# Patient Record
Sex: FEMALE | Race: WHITE
[De-identification: names, ages, dates, MRNs, and addresses within clinical notes are randomized per-mention and may not be internally consistent; named-entity substitution may affect disease eponyms.]

---

## 2023-05-25 ENCOUNTER — HOSPITAL ENCOUNTER (INPATIENT)
Dept: HOSPITAL 47 - 4FBP | Age: 38
LOS: 4 days | Discharge: HOME | End: 2023-05-29
Attending: OBSTETRICS & GYNECOLOGY | Admitting: OBSTETRICS & GYNECOLOGY
Payer: COMMERCIAL

## 2023-05-25 DIAGNOSIS — Z88.6: ICD-10-CM

## 2023-05-25 DIAGNOSIS — Z88.5: ICD-10-CM

## 2023-05-25 DIAGNOSIS — T36.95XA: ICD-10-CM
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
## 2023-05-25 DIAGNOSIS — O36.63X0: ICD-10-CM

## 2023-05-25 DIAGNOSIS — Z3A.39: ICD-10-CM

## 2023-05-25 DIAGNOSIS — L29.9: ICD-10-CM

## 2023-05-25 DIAGNOSIS — A08.4: ICD-10-CM

## 2023-05-25 LAB
BASOPHILS # BLD AUTO: 0 K/UL (ref 0–0.2)
BASOPHILS NFR BLD AUTO: 0 %
EOSINOPHIL # BLD AUTO: 0.1 K/UL (ref 0–0.7)
EOSINOPHIL NFR BLD AUTO: 2 %
ERYTHROCYTE [DISTWIDTH] IN BLOOD BY AUTOMATED COUNT: 3.82 M/UL (ref 3.8–5.4)
ERYTHROCYTE [DISTWIDTH] IN BLOOD: 13.1 % (ref 11.5–15.5)
HCT VFR BLD AUTO: 34.8 % (ref 34–46)
HGB BLD-MCNC: 11.9 GM/DL (ref 11.4–16)
LYMPHOCYTES # SPEC AUTO: 2 K/UL (ref 1–4.8)
LYMPHOCYTES NFR SPEC AUTO: 30 %
MCH RBC QN AUTO: 31.2 PG (ref 25–35)
MCHC RBC AUTO-ENTMCNC: 34.2 G/DL (ref 31–37)
MCV RBC AUTO: 91.1 FL (ref 80–100)
MONOCYTES # BLD AUTO: 0.4 K/UL (ref 0–1)
MONOCYTES NFR BLD AUTO: 7 %
NEUTROPHILS # BLD AUTO: 3.9 K/UL (ref 1.3–7.7)
NEUTROPHILS NFR BLD AUTO: 59 %
PLATELET # BLD AUTO: 169 K/UL (ref 150–450)
WBC # BLD AUTO: 6.6 K/UL (ref 3.8–10.6)

## 2023-05-25 PROCEDURE — 86901 BLOOD TYPING SEROLOGIC RH(D): CPT

## 2023-05-25 PROCEDURE — 86850 RBC ANTIBODY SCREEN: CPT

## 2023-05-25 PROCEDURE — 10907ZC DRAINAGE OF AMNIOTIC FLUID, THERAPEUTIC FROM PRODUCTS OF CONCEPTION, VIA NATURAL OR ARTIFICIAL OPENING: ICD-10-PCS

## 2023-05-25 PROCEDURE — 85027 COMPLETE CBC AUTOMATED: CPT

## 2023-05-25 PROCEDURE — 86900 BLOOD TYPING SEROLOGIC ABO: CPT

## 2023-05-25 PROCEDURE — 85025 COMPLETE CBC W/AUTO DIFF WBC: CPT

## 2023-05-25 PROCEDURE — 3E033VJ INTRODUCTION OF OTHER HORMONE INTO PERIPHERAL VEIN, PERCUTANEOUS APPROACH: ICD-10-PCS

## 2023-05-25 RX ADMIN — POTASSIUM CHLORIDE SCH MLS/HR: 14.9 INJECTION, SOLUTION INTRAVENOUS at 14:10

## 2023-05-25 RX ADMIN — POTASSIUM CHLORIDE SCH MLS/HR: 14.9 INJECTION, SOLUTION INTRAVENOUS at 06:47

## 2023-05-25 RX ADMIN — IBUPROFEN SCH MLS/HR: 800 INJECTION INTRAVENOUS at 22:40

## 2023-05-25 RX ADMIN — POTASSIUM CHLORIDE SCH MLS/HR: 14.9 INJECTION, SOLUTION INTRAVENOUS at 22:50

## 2023-05-25 RX ADMIN — ACETAMINOPHEN SCH MLS/HR: 10 INJECTION, SOLUTION INTRAVENOUS at 21:45

## 2023-05-25 NOTE — P.OP
Date of Procedure: 23


Preoperative Diagnosis: 


IUP at 39 weeks, history of LGA, arrest of first stage of labor


Postoperative Diagnosis: 


same, occiput posterior presentation


Procedure(s) Performed: 


Primary low transverse  section


Anesthesia: epidural


Surgeon: Saundra Joy


Assistant #1: Atnonia Machado


Estimated Blood Loss (ml): 230


IV fluids (ml): 800


Urine output (ml): 50


Pathology: other (Placenta)


Condition: stable


Disposition: observation


Indications for Procedure: 


38-year-old  at 39 weeks of pregnancy that presented earlier today for 

induction of labor.  Patient was admitted and Pitocin induction of labor was 

begun.  Patient made minimal progress throughout the day with no fetal descent 

noted.  Patient was counseled on fetal station and lack of dilation, patient 

states "something is wrong" she requested primary .   was 

discussed and anesthesia was called, patient was taken back to the operating 

suite.


Operative Findings: 


Viable male infant delivered at 20:08,  occiput posterior presentation


Weight of 8 lbs. 10 oz., Apgars of


Normal uterus tubes and ovaries were appreciated.


Description of Procedure: 


The patient was prepped and draped in the usual fashion after epidural 

anesthesia was found be adequate..  A Pfannenstiel incision was made and 

extended of the abdominal cavity without difficulty.  The bladder peritoneum was

elevated and incised and reflected distally.  A 2 cm incision was made in the 

transverse plane of the lower uterine segment to enter the uterus at which time 

clear fluid was noted.  The incision was extended in both directions using the 

bandage scissors.  The fetal head was encountered within the field and delivered

up and through the incision where the nose and mouth were thoroughly suctioned. 

Remainder of the infant was delivered onto the surgical field where the cord was

doubly clamped, cut, and the infant was passed for resuscitative measures with 

weight and Apgars as noted above.  The placenta was delivered manually, intact, 

and was grossly normal with a grossly normal three-vessel cord.  The uterus was 

exteriorized and the interior cavity of the uterus swept of any remaining 

placental and membranous fragments with a laparotomy sponge.  The margins of the

incision were grasped with López clamps and the incision closed in 2 

layers.  First layer was a running locking layer of 0 Vicryl from margin to 

margin followed by a second layer of imbricating 0 Vicryl from margin to margin.

 Bleeding was noted on the left-hand side of the hysterotomy incision therefore 

a figure-of-eight suture was used to obtain hemostasis Any small points of 

bleeding were then made hemostatic with the Bovie.  Once hemostasis was 

achieved, the posterior cul-de-sac was suctioned with a guard and the uterine 

and ovarian findings are as noted above.  The uterus was replaced within the 

abdominal cavity and the gutters swept of any remaining blood fluid or clot.  

The incision was again reexamined and hemostasis was noted to be excellent.  Any

small point of bleeding were made hemostatic with the Bovie.  Once hemostasis 

was achieved the parietal peritoneum was loosely reapproximated.  The layer of 

muscles were examined and made hemostatic with the Bovie.  Attention was then 

turned to the fascia which was closed with 2 running stitches of 0 Vicryl 

proceeding from the lateral margins to the midpoint.  The subcutaneous tissues 

were irrigated, made hemostatic with the Bovie, and reapproximated with a 

running stitch of 30 Vicryl.  The skin was reapproximated with  for Vicryl.  

Estimated blood loss for the case was approximately 230 mL.  All sponge 

instrument and needle counts are correct.  There were no complications.  The 

patient tolerated the procedure well and proceeded to the recovery room in 

stable condition.  Both mother and infant are resting comfortably in recovery.

## 2023-05-25 NOTE — P.HPOB
History of Present Illness


H&P Date: 23


Chief Complaint: IUP at 39 weeks, history of LGA





This is a 38-year-old  2 para 1 at 39 1/7 weeks of gestation that 

presents for induction of labor secondary to history of LGA.  Patient has been 

receiving routine prenatal care with myself which has been essentially unc

omplicated.  Patient has been quite uncomfortable and requested induction of 

labor at 39 weeks.  


Patient does note good fetal movement denies vaginal bleeding contractions or 

loss of fluid.


On prenatal blood work this patient a positive, rubella status immune, hepatitis

B surface engine negative, HIV negative, RPR is nonreactive, group strep 

cultures negative.





Review of Systems


Constitutional: Denies chills, Denies fatigue, Denies fever


Ears, nose, mouth and throat: Denies headache


Cardiovascular: Reports leg edema


Respiratory: Denies dyspnea


Gastrointestinal: Denies constipation, Denies diarrhea, Denies nausea, Denies 

vomiting


Genitourinary: Reports pregnant





Past Medical History


Past Medical History: No Reported History


History of Any Multi-Drug Resistant Organisms: None Reported


Past Surgical History: No Surgical Hx Reported


Additional Past Surgical History / Comment(s): knee surgery+ ,


Past Anesthesia/Blood Transfusion Reactions: No Reported Reaction


Past Psychological History: No Psychological Hx Reported


Smoking Status: Never smoker


Past Drug Use History: None Reported





- Past Family History


  ** Mother


Family Medical History: No Reported History


Additional Family Medical History / Comment(s): breast cancer serviver





Medications and Allergies


                                Home Medications











 Medication  Instructions  Recorded  Confirmed  Type


 


Prenatal Vit No.179/Iron/Folic 1 tab PO DAILY 23 History





[Prenatal Tablet]    








                                    Allergies











Allergy/AdvReac Type Severity Reaction Status Date / Time


 


ibuprofen [From Motrin] AdvReac  Diarrhea Verified 23 08:53


 


morphine AdvReac  Vomiting Verified 23 08:53














Exam


Osteopathic Statement: *.  No significant issues noted on an osteopathic 

structural exam other than those noted in the History and Physical/Consult.


                                   Vital Signs











  Temp Pulse Resp BP


 


 23 08:00  97.0 F L  84  16  126/71








                                Intake and Output











 23





 06:59 14:59 22:59


 


Other:   


 


  # Voids  1 


 


  Weight 99.337 kg 99.337 kg 














Targeted physical exam is performed in this date and generalist well-nourished 

well-developed pregnant female in no acute distress, breathing is nonlabored, 

heart has a regular rate and rhythm, abdomen is gravid, on cervical exam she is 

1 thick high amniotomy is performed and clear fluid was obtained, fetal heart 

tones are noted to be category 1 and contractions are noted to be every 4 

minutes





Results


Result Diagrams: 


                                 23 06:40








Assessment and Plan


(1) Term pregnancy


Current Visit: Yes   Status: Acute   Code(s): Z34.90 - ENCNTR FOR SUPRVSN OF 

NORMAL PREGNANCY, UNSP, UNSP TRIMESTER   SNOMED Code(s): 49439741


   


Plan: 





37 yo  at 39 weeks that presents for induction of labor secondary to history

of LGA.  Patient has been quite uncomfortable and did request elective 

induction.  Patient is admitted and Pitocin induction of labor is begun per 

hospital protocol.  Amniotomy is performed and clear fluid was obtained.  

Options for analgesia are discussed including Stadol and epidural.  Patient does

desire epidural when appropriate.

## 2023-05-26 LAB
BASOPHILS # BLD AUTO: 0 K/UL (ref 0–0.2)
BASOPHILS NFR BLD AUTO: 0 %
EOSINOPHIL # BLD AUTO: 0 K/UL (ref 0–0.7)
EOSINOPHIL NFR BLD AUTO: 0 %
ERYTHROCYTE [DISTWIDTH] IN BLOOD BY AUTOMATED COUNT: 3.31 M/UL (ref 3.8–5.4)
ERYTHROCYTE [DISTWIDTH] IN BLOOD: 13.6 % (ref 11.5–15.5)
HCT VFR BLD AUTO: 29.8 % (ref 34–46)
HGB BLD-MCNC: 10.1 GM/DL (ref 11.4–16)
LYMPHOCYTES # SPEC AUTO: 1.1 K/UL (ref 1–4.8)
LYMPHOCYTES NFR SPEC AUTO: 7 %
MCH RBC QN AUTO: 30.5 PG (ref 25–35)
MCHC RBC AUTO-ENTMCNC: 33.9 G/DL (ref 31–37)
MCV RBC AUTO: 90 FL (ref 80–100)
MONOCYTES # BLD AUTO: 0.6 K/UL (ref 0–1)
MONOCYTES NFR BLD AUTO: 4 %
NEUTROPHILS # BLD AUTO: 14.2 K/UL (ref 1.3–7.7)
NEUTROPHILS NFR BLD AUTO: 88 %
PLATELET # BLD AUTO: 156 K/UL (ref 150–450)
WBC # BLD AUTO: 16 K/UL (ref 3.8–10.6)

## 2023-05-26 RX ADMIN — ACETAMINOPHEN SCH MG: 500 TABLET ORAL at 17:07

## 2023-05-26 RX ADMIN — IBUPROFEN SCH MLS/HR: 800 INJECTION INTRAVENOUS at 14:11

## 2023-05-26 RX ADMIN — Medication SCH: at 09:25

## 2023-05-26 RX ADMIN — IBUPROFEN SCH: 600 TABLET ORAL at 09:42

## 2023-05-26 RX ADMIN — IBUPROFEN SCH: 600 TABLET ORAL at 21:23

## 2023-05-26 RX ADMIN — POTASSIUM CHLORIDE SCH: 14.9 INJECTION, SOLUTION INTRAVENOUS at 16:39

## 2023-05-26 RX ADMIN — DOCUSATE SODIUM AND SENNOSIDES SCH: 50; 8.6 TABLET ORAL at 14:13

## 2023-05-26 RX ADMIN — IBUPROFEN SCH MLS/HR: 800 INJECTION INTRAVENOUS at 07:23

## 2023-05-26 RX ADMIN — IBUPROFEN SCH: 600 TABLET ORAL at 13:20

## 2023-05-26 RX ADMIN — IBUPROFEN SCH: 600 TABLET ORAL at 00:46

## 2023-05-26 RX ADMIN — ACETAMINOPHEN SCH MG: 500 TABLET ORAL at 23:20

## 2023-05-26 RX ADMIN — POTASSIUM CHLORIDE SCH: 14.9 INJECTION, SOLUTION INTRAVENOUS at 16:40

## 2023-05-26 RX ADMIN — DOCUSATE SODIUM AND SENNOSIDES SCH EACH: 50; 8.6 TABLET ORAL at 19:52

## 2023-05-26 RX ADMIN — ACETAMINOPHEN SCH: 500 TABLET ORAL at 06:13

## 2023-05-26 RX ADMIN — ACETAMINOPHEN SCH MG: 500 TABLET ORAL at 10:55

## 2023-05-26 RX ADMIN — DOCUSATE SODIUM AND SENNOSIDES SCH: 50; 8.6 TABLET ORAL at 01:58

## 2023-05-26 RX ADMIN — ACETAMINOPHEN SCH MLS/HR: 10 INJECTION, SOLUTION INTRAVENOUS at 05:33

## 2023-05-26 RX ADMIN — IBUPROFEN SCH MLS/HR: 800 INJECTION INTRAVENOUS at 19:51

## 2023-05-26 RX ADMIN — POTASSIUM CHLORIDE SCH MLS/HR: 14.9 INJECTION, SOLUTION INTRAVENOUS at 05:37

## 2023-05-26 RX ADMIN — ACETAMINOPHEN SCH: 500 TABLET ORAL at 00:45

## 2023-05-26 RX ADMIN — POTASSIUM CHLORIDE SCH: 14.9 INJECTION, SOLUTION INTRAVENOUS at 21:23

## 2023-05-26 NOTE — P.PNOBGPC
Subjective





- Subjective


Principal diagnosis: POD 1 LTCS


Interval history: 





patient is doing well postoperatively.  She is a bleeding without difficulty.  

Powell was discontinued and we are awaiting spontaneous void.  Her pain is well-

controlled.


Patient reports: Reports appetite normal, Reports pain well controlled, Reports 

ambulating normally


: doing well





Objective





- Vital Signs


Latest vital signs: 


                                   Vital Signs











  Temp Pulse Resp BP Pulse Ox


 


 23 08:00  98.0 F  96  16  119/77  97


 


 23 04:00  98.3 F  85  19  119/74  97


 


 23 00:00  98.9 F  99  15  125/71  96


 


 23 22:35  98.8 F  75  16  131/67  98


 


 23 22:05  99.1 F  91  16  128/65  99


 


 23 21:35   76  16  120/60  98


 


 23 21:20   71  16  121/59  98


 


 23 21:05  97.9 F  80  16  108/55  97


 


 23 20:50     102/56 


 


 23 20:35  98.2 F  65  16  104/53  94 L








                                Intake and Output











 23





 22:59 06:59 14:59


 


Intake Total 240 480 


 


Output Total 300 1000 


 


Balance -60 -520 


 


Intake:   


 


  Oral 240 480 


 


Output:   


 


  Urine 300 1000 


 


    Uretheral (Powell) 100 400 


 


Other:   


 


  Voiding Method Indwelling Catheter Indwelling Catheter 














- Exam


Extremities: Present: normal, edema


Abdomen: Present: normal appearance, soft


Incision: Present: normal, dry


Uterus: Present: normal, firm





- Labs


Labs: 


                  Abnormal Lab Results - Last 24 Hours (Table)











  23 Range/Units





  06:01 


 


WBC  16.0 H  (3.8-10.6)  k/uL


 


RBC  3.31 L  (3.80-5.40)  m/uL


 


Hgb  10.1 L  (11.4-16.0)  gm/dL


 


Hct  29.8 L  (34.0-46.0)  %


 


Neutrophils #  14.2 H  (1.3-7.7)  k/uL














Assessment and Plan


(1) Term pregnancy


Current Visit: Yes   Status: Acute   Code(s): Z34.90 - ENCNTR FOR SUPRVSN OF 

NORMAL PREGNANCY, UNSP, UNSP TRIMESTER   SNOMED Code(s): 06040173


   





(2) S/P  section


Current Visit: Yes   Status: Acute   Code(s): Z98.891 - HISTORY OF UTERINE SCAR 

FROM PREVIOUS SURGERY   SNOMED Code(s): 510514235


   





(3) Occiput posterior presentation of fetus


Current Visit: Yes   Status: Acute   Code(s): O64.0XX0 - OBSTRUCTED LABOR DUE TO

INCMPL ROTATION OF FETAL HEAD, UNSP   SNOMED Code(s): 87956056


   


Plan: 





38-year-old G2 now P2 status post primary  for arrest of first stage of

labor, upon delivery fetus was noted to be in occiput posterior presentation.  

Patient is doing well postoperatively.  We'll plan to continue routine 

postoperative care and anticipate discharge home tomorrow.

## 2023-05-26 NOTE — P.PN
Progress Note - Text





23  610am


38-year-old female status post .  She was bolused Duramorph Y the 

epidural.  Patient has a VAS of 0 with no complains of nausea vomiting she does 

have pruritus should resolve by the end of the day

## 2023-05-27 LAB
BASOPHILS # BLD AUTO: 0 K/UL (ref 0–0.2)
BASOPHILS NFR BLD AUTO: 0 %
EOSINOPHIL # BLD AUTO: 0.1 K/UL (ref 0–0.7)
EOSINOPHIL NFR BLD AUTO: 1 %
ERYTHROCYTE [DISTWIDTH] IN BLOOD BY AUTOMATED COUNT: 3.09 M/UL (ref 3.8–5.4)
ERYTHROCYTE [DISTWIDTH] IN BLOOD BY AUTOMATED COUNT: 3.11 M/UL (ref 3.8–5.4)
ERYTHROCYTE [DISTWIDTH] IN BLOOD: 13.3 % (ref 11.5–15.5)
ERYTHROCYTE [DISTWIDTH] IN BLOOD: 13.3 % (ref 11.5–15.5)
HCT VFR BLD AUTO: 28.8 % (ref 34–46)
HCT VFR BLD AUTO: 28.9 % (ref 34–46)
HGB BLD-MCNC: 9.5 GM/DL (ref 11.4–16)
HGB BLD-MCNC: 9.5 GM/DL (ref 11.4–16)
LYMPHOCYTES # SPEC AUTO: 0.7 K/UL (ref 1–4.8)
LYMPHOCYTES NFR SPEC AUTO: 8 %
MCH RBC QN AUTO: 30.7 PG (ref 25–35)
MCH RBC QN AUTO: 30.9 PG (ref 25–35)
MCHC RBC AUTO-ENTMCNC: 33 G/DL (ref 31–37)
MCHC RBC AUTO-ENTMCNC: 33 G/DL (ref 31–37)
MCV RBC AUTO: 93.1 FL (ref 80–100)
MCV RBC AUTO: 93.4 FL (ref 80–100)
MONOCYTES # BLD AUTO: 0.4 K/UL (ref 0–1)
MONOCYTES NFR BLD AUTO: 4 %
NEUTROPHILS # BLD AUTO: 7.2 K/UL (ref 1.3–7.7)
NEUTROPHILS NFR BLD AUTO: 86 %
PLATELET # BLD AUTO: 162 K/UL (ref 150–450)
PLATELET # BLD AUTO: 164 K/UL (ref 150–450)
WBC # BLD AUTO: 11.4 K/UL (ref 3.8–10.6)
WBC # BLD AUTO: 8.4 K/UL (ref 3.8–10.6)

## 2023-05-27 RX ADMIN — DOCUSATE SODIUM AND SENNOSIDES SCH EACH: 50; 8.6 TABLET ORAL at 07:47

## 2023-05-27 RX ADMIN — ACETAMINOPHEN SCH MG: 500 TABLET ORAL at 15:47

## 2023-05-27 RX ADMIN — POTASSIUM CHLORIDE SCH MLS/HR: 14.9 INJECTION, SOLUTION INTRAVENOUS at 15:50

## 2023-05-27 RX ADMIN — POTASSIUM CHLORIDE SCH: 14.9 INJECTION, SOLUTION INTRAVENOUS at 06:03

## 2023-05-27 RX ADMIN — IBUPROFEN SCH MG: 600 TABLET ORAL at 19:13

## 2023-05-27 RX ADMIN — IBUPROFEN SCH MLS/HR: 800 INJECTION INTRAVENOUS at 02:02

## 2023-05-27 RX ADMIN — IBUPROFEN SCH MG: 600 TABLET ORAL at 08:50

## 2023-05-27 RX ADMIN — IBUPROFEN SCH: 600 TABLET ORAL at 02:02

## 2023-05-27 RX ADMIN — Medication SCH: at 20:43

## 2023-05-27 RX ADMIN — ACETAMINOPHEN SCH MG: 500 TABLET ORAL at 04:59

## 2023-05-27 RX ADMIN — DOCUSATE SODIUM AND SENNOSIDES SCH EACH: 50; 8.6 TABLET ORAL at 21:50

## 2023-05-27 RX ADMIN — IBUPROFEN SCH MG: 600 TABLET ORAL at 14:54

## 2023-05-27 RX ADMIN — ACETAMINOPHEN SCH MG: 500 TABLET ORAL at 21:51

## 2023-05-27 NOTE — P.PNOBGPC
Subjective





- Subjective


Interval history: 





the patient reports moderate pain at this time, wishes to remain in the hospital

for another day.


Patient reports: Reports appetite normal, Reports voiding normally, Reports 

ambulating normally


[place]: doing well





Objective





- Vital Signs


Latest vital signs: 


                                   Vital Signs











  Temp Pulse Resp BP Pulse Ox


 


 23 08:00  98.3 F  102 H  16  118/62 


 


 23 05:10  98.1 F  98  18  121/74  99


 


 23 23:41  98.1 F  83  18  119/74  98


 


 23 20:00  98.3 F  87  18  118/70  97


 


 23 16:00  99.2 F  138 H  40 H  


 


 23 11:54  98.4 F  82  16  125/72  98








                                Intake and Output











 23





 22:59 06:59 14:59


 


Other:   


 


  # Voids 2 1 














- Exam


Extremities: Present: normal


Abdomen: Present: normal appearance, soft.  Absent: distention, tenderness


Incision: Present: normal, dry, intact


Uterus: Present: normal, firm (the uterine fundus is tonic inappropriately 

tender just below the umbilicus.)





- Labs


Labs: 


                  Abnormal Lab Results - Last 24 Hours (Table)











  23 Range/Units





  06:38 


 


WBC  11.4 H  (3.8-10.6)  k/uL


 


RBC  3.09 L  (3.80-5.40)  m/uL


 


Hgb  9.5 L  (11.4-16.0)  gm/dL


 


Hct  28.8 L  (34.0-46.0)  %














Assessment and Plan


(1) S/P  section


Current Visit: Yes   Status: Acute   Code(s): Z98.891 - HISTORY OF UTERINE SCAR 

FROM PREVIOUS SURGERY   SNOMED Code(s): 996406250


   


Plan: 





continue routine postpartum and postoperative care.  I have encouraged the cele tavares amulet in the hallways routinely.  I would anticipate discharge home 

tomorrow pending no complications.

## 2023-05-28 RX ADMIN — IBUPROFEN SCH MG: 600 TABLET ORAL at 18:15

## 2023-05-28 RX ADMIN — IBUPROFEN SCH: 800 INJECTION INTRAVENOUS at 21:46

## 2023-05-28 RX ADMIN — Medication SCH EACH: at 09:45

## 2023-05-28 RX ADMIN — ACETAMINOPHEN SCH MG: 500 TABLET ORAL at 03:50

## 2023-05-28 RX ADMIN — DOCUSATE SODIUM AND SENNOSIDES SCH: 50; 8.6 TABLET ORAL at 09:00

## 2023-05-28 RX ADMIN — IBUPROFEN SCH MG: 600 TABLET ORAL at 00:56

## 2023-05-28 RX ADMIN — ACETAMINOPHEN SCH: 500 TABLET ORAL at 20:10

## 2023-05-28 RX ADMIN — ACETAMINOPHEN SCH MG: 500 TABLET ORAL at 15:40

## 2023-05-28 RX ADMIN — IBUPROFEN SCH MG: 600 TABLET ORAL at 12:51

## 2023-05-28 RX ADMIN — ACETAMINOPHEN SCH MG: 500 TABLET ORAL at 20:09

## 2023-05-28 RX ADMIN — IBUPROFEN SCH MG: 600 TABLET ORAL at 06:38

## 2023-05-28 RX ADMIN — ACETAMINOPHEN SCH MG: 500 TABLET ORAL at 09:38

## 2023-05-28 RX ADMIN — DOCUSATE SODIUM AND SENNOSIDES SCH: 50; 8.6 TABLET ORAL at 20:09

## 2023-05-28 NOTE — P.PNOBGPC
Subjective





- Subjective


Interval history: 





the patient reports significant improvement on pain.  She did have several ep

isodes of diarrhea throughout the night.  She denies breast engorgement.  There 

are no other localizing signs.  She reports she is ambulating significantly more

and better able to do so.


Patient reports: Reports appetite normal, Reports voiding normally, Reports pain

well controlled, Reports ambulating normally


: doing well





Objective





- Vital Signs


Latest vital signs: 


                                   Vital Signs











  Temp Pulse Resp BP Pulse Ox


 


 23 23:09  98.3 F    


 


 23 21:53  98 F  103 H  20  111/69  98


 


 23 15:15  101.8 F H  114 H  20  115/71  98














- Exam


Extremities: Present: normal


Abdomen: Present: normal appearance, soft.  Absent: distention, tenderness


Incision: Present: normal, dry, intact, other (there is no evidence of wound 

infection or fluid collection to palpation.)


Uterus: Present: normal, firm (the uterine fundus as tonic and appropriately 

tender below the umbilicus.)





- Labs


Labs: 


                  Abnormal Lab Results - Last 24 Hours (Table)











  23 Range/Units





  15:31 


 


RBC  3.11 L  (3.80-5.40)  m/uL


 


Hgb  9.5 L  (11.4-16.0)  gm/dL


 


Hct  28.9 L  (34.0-46.0)  %


 


Lymphocytes #  0.7 L  (1.0-4.8)  k/uL














Assessment and Plan


(1) S/P  section


Current Visit: Yes   Status: Acute   Code(s): Z98.891 - HISTORY OF UTERINE SCAR 

FROM PREVIOUS SURGERY   SNOMED Code(s): 926339875


   


Plan: 





the patient developed a fever yesterday in the afternoon to approximately 102F.

 She was prophylactically started on Mefoxin 2 g IV every 8 hours.  She did have

several episodes of diarrhea through the night.  White blood cell count is 

normal and hemoglobin is stable.  She is well covered from a wound infection or 

endometritis perspective and will require 24-48 hours without a fever for 

discharge consideration.  I suspect she may have a superimposed viral 

gastroenteritis given her diarrhea as that developed too soon following 

initiation of antibiotics to be antibiotic related.  I have again encouraged her

to ambulate in the hallways routinely and discussed with she and her family the 

plan which would be for discharge tomorrow pending no further complications or 

fevers.

## 2023-05-29 VITALS
DIASTOLIC BLOOD PRESSURE: 72 MMHG | HEART RATE: 88 BPM | SYSTOLIC BLOOD PRESSURE: 114 MMHG | TEMPERATURE: 97.3 F | RESPIRATION RATE: 16 BRPM

## 2023-05-29 RX ADMIN — DOCUSATE SODIUM AND SENNOSIDES SCH: 50; 8.6 TABLET ORAL at 09:12

## 2023-05-29 RX ADMIN — Medication SCH EACH: at 08:30

## 2023-05-29 RX ADMIN — ACETAMINOPHEN SCH MG: 500 TABLET ORAL at 04:35

## 2023-05-29 RX ADMIN — IBUPROFEN SCH MG: 600 TABLET ORAL at 08:29

## 2023-05-29 RX ADMIN — IBUPROFEN SCH MG: 600 TABLET ORAL at 00:00

## 2023-05-29 NOTE — P.DS
Providers


Date of admission: 


23 06:00





Expected date of discharge: 23


Attending physician: 


Saundra Joy





Primary care physician: 


Stated None








- Discharge Diagnosis(es)


(1) S/P  section


Current Visit: Yes   Status: Acute   





(2) Postoperative fever


Current Visit: Yes   Status: Acute   


Hospital Course: 





the patient is a 38-year-old  2 para 1001 admitted at 39 and one sevenths

weeks by good dating parameters.  She is admitted for elective induction 

secondary to suspected fetal macrosomia.  She has had an uncomplicated pregnancy

and group B strep status is negative.  On labor and delivery, she had Pitocin 

started and underwent artificial rupture of membranes.  she had an epidural 

catheter placed for analgesia.  She made minimal progress throughout the day and

ultimately the decision was made to proceed with primary low-transverse 

section.  She was taken the operating room where she underwent the procedure and

an uncomplicated fashion was delivered of a viable 8 lbs. 10 oz. baby boy with 

Apgars of 9 at 1 minute and 9 at 5 minutes.  Her postpartum course was 

complicated by a moderate amount of pain on postoperative day 1 and 2.  On the 

afternoon of postoperative day #2, she developed a fever to approximately 102F.

 There was no evidence of breast engorgement nor any other localizing signs 

aside from fairly significant.  Incisional discomfort.  The decision was made to

cover her with antibiotics and Mefoxin 2 g every 8 hours was started.  She had 

immediate diminution of her fever and her pain improved significantly over the 

next 24 hours.  She remained without a fever for approximately 36-40 hours and 

was deemed stable for discharge on postoperative and postpartum day #4.  She was

discharged home to follow-up in the office in 2 weeks for incision check and 6 

weeks routinely.  Discharge instructions included calling for any significantly 

increased bleeding or foul-smelling lochia, significantly increased fever 

abdominal pain, perineal complaints, breast complaints, incisional complaints, 

or anything else that concerned her.  She is additionally instructed to abstain 

from anything in the vagina to include intercourse for at least 6 weeks, to do 

no heavy lifting over the same period of time, and lastly, to do no driving 

until off of all pain medications or 2 weeks' time, whichever comes first.  She 

understood all of her instructions and agrees follow up as noted above.  

Discharge medications included any home medications as well as over-the-counter 

analgesic pain medications.  She was provided with a prescription for Norco 

5/325 mg, 1-2 by mouth every 6 hours when necessary pain, #20 dispensed with no 

refills.  Maternal blood type is A+ and rubella status is immune.  Discharge 

hemoglobin and hematocrit were 9.5 and 28.9 respectively.


Procedures: 





#1.  Pitocin induction #2.  Artificial rupture of membranes #3.  Epidural 

analgesia #4.  Primary low-transverse  section #5.  IV antibiotic 

treatment


Patient Condition at Discharge: Stable





Plan - Discharge Summary


New Discharge Prescriptions: 


No Action


   Prenatal Vit No.179/Iron/Folic [Prenatal Tablet] 1 tab PO DAILY


Discharge Medication List





Prenatal Vit No.179/Iron/Folic [Prenatal Tablet] 1 tab PO DAILY 23 

[History]








Follow up Appointment(s)/Referral(s): 


Saundra Joy DO [Doctor of Osteopathic Medicine] - 2 Weeks


Discharge Disposition: HOME SELF-CARE

## 2023-05-30 ENCOUNTER — HOSPITAL ENCOUNTER (INPATIENT)
Dept: HOSPITAL 47 - EC | Age: 38
LOS: 3 days | Discharge: HOME | DRG: 776 | End: 2023-06-02
Attending: INTERNAL MEDICINE | Admitting: INTERNAL MEDICINE
Payer: COMMERCIAL

## 2023-05-30 DIAGNOSIS — F41.9: ICD-10-CM

## 2023-05-30 DIAGNOSIS — E87.70: ICD-10-CM

## 2023-05-30 DIAGNOSIS — A04.72: ICD-10-CM

## 2023-05-30 DIAGNOSIS — E87.3: ICD-10-CM

## 2023-05-30 DIAGNOSIS — Z88.5: ICD-10-CM

## 2023-05-30 DIAGNOSIS — Z79.891: ICD-10-CM

## 2023-05-30 DIAGNOSIS — K51.90: ICD-10-CM

## 2023-05-30 DIAGNOSIS — Z88.6: ICD-10-CM

## 2023-05-30 DIAGNOSIS — K80.00: ICD-10-CM

## 2023-05-30 DIAGNOSIS — E83.42: ICD-10-CM

## 2023-05-30 DIAGNOSIS — Z87.891: ICD-10-CM

## 2023-05-30 DIAGNOSIS — E86.0: ICD-10-CM

## 2023-05-30 LAB
ALBUMIN SERPL-MCNC: 3 G/DL (ref 3.5–5)
ALP SERPL-CCNC: 72 U/L (ref 38–126)
ALT SERPL-CCNC: 31 U/L (ref 4–34)
ANION GAP SERPL CALC-SCNC: 8 MMOL/L
AST SERPL-CCNC: 22 U/L (ref 14–36)
BASOPHILS # BLD AUTO: 0 K/UL (ref 0–0.2)
BASOPHILS NFR BLD AUTO: 0 %
BUN SERPL-SCNC: 12 MG/DL (ref 7–17)
CALCIUM SPEC-MCNC: 8.5 MG/DL (ref 8.4–10.2)
CHLORIDE SERPL-SCNC: 106 MMOL/L (ref 98–107)
CO2 SERPL-SCNC: 22 MMOL/L (ref 22–30)
EOSINOPHIL # BLD AUTO: 0 K/UL (ref 0–0.7)
EOSINOPHIL NFR BLD AUTO: 1 %
ERYTHROCYTE [DISTWIDTH] IN BLOOD BY AUTOMATED COUNT: 3.03 M/UL (ref 3.8–5.4)
ERYTHROCYTE [DISTWIDTH] IN BLOOD: 13 % (ref 11.5–15.5)
GLUCOSE SERPL-MCNC: 99 MG/DL (ref 74–99)
HCT VFR BLD AUTO: 27.6 % (ref 34–46)
HGB BLD-MCNC: 9.5 GM/DL (ref 11.4–16)
LIPASE SERPL-CCNC: 66 U/L (ref 23–300)
LYMPHOCYTES # SPEC AUTO: 1.1 K/UL (ref 1–4.8)
LYMPHOCYTES NFR SPEC AUTO: 18 %
MAGNESIUM SPEC-SCNC: 1.6 MG/DL (ref 1.6–2.3)
MCH RBC QN AUTO: 31.4 PG (ref 25–35)
MCHC RBC AUTO-ENTMCNC: 34.5 G/DL (ref 31–37)
MCV RBC AUTO: 91.1 FL (ref 80–100)
MONOCYTES # BLD AUTO: 0.4 K/UL (ref 0–1)
MONOCYTES NFR BLD AUTO: 7 %
NEUTROPHILS # BLD AUTO: 4.4 K/UL (ref 1.3–7.7)
NEUTROPHILS NFR BLD AUTO: 73 %
PH UR: 5.5 [PH] (ref 5–8)
PLATELET # BLD AUTO: 202 K/UL (ref 150–450)
POTASSIUM SERPL-SCNC: 3.5 MMOL/L (ref 3.5–5.1)
PROT SERPL-MCNC: 5.4 G/DL (ref 6.3–8.2)
RBC UR QL: >182 /HPF (ref 0–5)
SODIUM SERPL-SCNC: 136 MMOL/L (ref 137–145)
SP GR UR: 1.02 (ref 1–1.03)
SQUAMOUS UR QL AUTO: 4 /HPF (ref 0–4)
UROBILINOGEN UR QL STRIP: <2 MG/DL (ref ?–2)
WBC # BLD AUTO: 6 K/UL (ref 3.8–10.6)
WBC #/AREA URNS HPF: 98 /HPF (ref 0–5)

## 2023-05-30 PROCEDURE — 84484 ASSAY OF TROPONIN QUANT: CPT

## 2023-05-30 PROCEDURE — 85379 FIBRIN DEGRADATION QUANT: CPT

## 2023-05-30 PROCEDURE — 81001 URINALYSIS AUTO W/SCOPE: CPT

## 2023-05-30 PROCEDURE — 96376 TX/PRO/DX INJ SAME DRUG ADON: CPT

## 2023-05-30 PROCEDURE — 83690 ASSAY OF LIPASE: CPT

## 2023-05-30 PROCEDURE — 96375 TX/PRO/DX INJ NEW DRUG ADDON: CPT

## 2023-05-30 PROCEDURE — 80053 COMPREHEN METABOLIC PANEL: CPT

## 2023-05-30 PROCEDURE — 96374 THER/PROPH/DIAG INJ IV PUSH: CPT

## 2023-05-30 PROCEDURE — 36415 COLL VENOUS BLD VENIPUNCTURE: CPT

## 2023-05-30 PROCEDURE — 83605 ASSAY OF LACTIC ACID: CPT

## 2023-05-30 PROCEDURE — 99285 EMERGENCY DEPT VISIT HI MDM: CPT

## 2023-05-30 PROCEDURE — 74177 CT ABD & PELVIS W/CONTRAST: CPT

## 2023-05-30 PROCEDURE — 87324 CLOSTRIDIUM AG IA: CPT

## 2023-05-30 PROCEDURE — 85025 COMPLETE CBC W/AUTO DIFF WBC: CPT

## 2023-05-30 PROCEDURE — 71275 CT ANGIOGRAPHY CHEST: CPT

## 2023-05-30 PROCEDURE — 96361 HYDRATE IV INFUSION ADD-ON: CPT

## 2023-05-30 PROCEDURE — 76705 ECHO EXAM OF ABDOMEN: CPT

## 2023-05-30 PROCEDURE — 83735 ASSAY OF MAGNESIUM: CPT

## 2023-05-30 PROCEDURE — 71045 X-RAY EXAM CHEST 1 VIEW: CPT

## 2023-05-30 PROCEDURE — 80048 BASIC METABOLIC PNL TOTAL CA: CPT

## 2023-05-30 RX ADMIN — VANCOMYCIN HYDROCHLORIDE SCH MG: 125 CAPSULE ORAL at 21:34

## 2023-05-30 RX ADMIN — CEFAZOLIN SCH MLS/HR: 330 INJECTION, POWDER, FOR SOLUTION INTRAMUSCULAR; INTRAVENOUS at 21:34

## 2023-05-30 RX ADMIN — HYDROMORPHONE HYDROCHLORIDE PRN MG: 1 INJECTION, SOLUTION INTRAMUSCULAR; INTRAVENOUS; SUBCUTANEOUS at 22:52

## 2023-05-30 RX ADMIN — VANCOMYCIN HYDROCHLORIDE SCH MG: 125 CAPSULE ORAL at 19:41

## 2023-05-30 NOTE — CT
EXAMINATION TYPE: CT abdomen pelvis w con

 

DATE OF EXAM: 2023

 

COMPARISON: None

 

INDICATION: pain, r/o infection,  was done 23

 

DLP: 1488.7 mGycm, Automated exposure control for dose reduction was used.

 

CONTRAST:  100 mL of Isovue 300. 

                        Study performed without Oral Contrast

 

TECHNIQUE: Axial images were obtained from above the diaphragm to the pubic rami in the axial plane a
t 5 mm thick sections.  Reconstructed images are reviewed on the computer in the coronal plane.  

 

FINDINGS:

 

Limited CT sections are obtained the lung bases.  The lung bases are clear.

 

Extraperitoneal Subcutaneous emphysema is present may be related to recent . No intraperiton
eal free air is identified. Small amount of bowel gas extends lateral to the liver.

 

Gallbladder contains a gallstone. Pericholecystic fluid could be considered.

 

There is diffuse thickening through the transverse colon. Correlate for colitis. This may extend from
 the ascending colon region and hepatic flexure. Descending colon is mildly thickened.

 

Uterus is bulky compatible with recent pregnancy. 

Urinary bladder contains a small amount of air.

 

CT ABDOMEN: 

Liver: Normal

 

Spleen: Normal

 

Pancreas: Normal

 

Adrenal glands: The adrenal glands are normal.

 

Kidneys: No masses are evident. No hydronephrosis is present.   No cysts are present.

 

Aorta: Normal

 

Inferior vena cava: Normal.

 

CT PELVIS: No suspicious abscess formation is identified.

Appendix: Normal as visualized.

 

Osseous structures: No suspicious lytic or sclerotic lesions.

 

IMPRESSIONS:

1.  Diffusely thickened ascending transverse and proximal descending colon. Correlate for colitis. 

2. Subcutaneous emphysema along the anterior lateral regions may be related to recent .

3. Gallstone and/or cholecystitis. Clinical correlation recommended.

## 2023-05-30 NOTE — US
EXAMINATION TYPE: US gallbladder

 

DATE OF EXAM: 2023

 

COMPARISON: CT: Today

 

CLINICAL INDICATION: Female, 38 years old with history of Epigastric pain, possible cholecystitis on 
CT; epigastric pain x a week.  last week. Pt has C-Diff

 

TECHNIQUE: Multiple sonographic images of the right upper quadrant are obtained.

 

FINDINGS:

 

EXAM MEASUREMENTS:

 

Liver Length:  18.9 cm   

Gallbladder Wall:  1.3 cm   

CBD:  Not well visualized 

Right Kidney:  14.1 x 5.4 x 4.5cm

 

SONOGRAPHER NOTES:

 

Pancreas:  wnl

Liver:  wnl  

Gallbladder:  Thickened wall

**Evidence for sonographic Daily's sign:  Pt in pain, but did not appear positive for daily's sign.
 Pt did state she just got a dose of dilaudid

CBD:  Not seen 

Right Kidney:  wnl 

 

 

 

IMPRESSION: 

 

1. Gallbladder wall appears markedly thickened. Some minimal pericholecystic fluid may be present. Cl
inical correlation recommended for acute cholecystitis.

## 2023-05-30 NOTE — ED
Abdominal Pain HPI





- General


Source: patient, RN notes reviewed


Mode of arrival: wheelchair


Limitations: no limitations





<Manohar Martinez - Last Filed: 23 14:26>





- General


Source: patient, RN notes reviewed


Mode of arrival: wheelchair


Limitations: no limitations





- History of Present Illness


MD Complaint: abdominal pain, other (diarrhea)


Location: epigastric





<Regina Wilson - Last Filed: 23 22:53>





- General


Chief Complaint: Abdominal Pain


Stated Complaint: post op


Time Seen by Provider: 23 14:27





- History of Present Illness


Initial Comments: 


38-year-old female presents emergency Department chief complaint of profuse 

diarrhea.  Patient states she had a  states that she developed a fever 

after long with diarrhea.  Patient was placed on IV antibiotics and hold.  

Patient was discharged and was then called in a prescription for Flagyl.  

Patient states taken 1 dose.  Patient states she feels very dehydrated, 

increased abdominal cramping.  Patient states she's going every few minutes.  

She has not collect a stool sample.


 (Manohar Martinez)


When I went to evaluate the patient, states that her  took place on 

 and she was discharged yesterday.  She was on 2 different IV antibiotics, 

and as listed above took one dose of the Flagyl she was prescribed earlier 

today.  Reports diarrhea starting 4 days ago. States that this is watery 

diarrhea every few minutes.  She has had intermittent waves of nausea but no 

vomiting.  She's been unable to eat without experiencing profuse diarrhea.  

Additionally, states she is having upper abdominal pain/cramping.  She has also 

had fevers reaching 102F. Denies any hx of c. diff. She was instructed by 

[facility] to come to the emergency department for evaluation. 





Denies any sore throat, cough, dyspnea, chest pain, palpitations, vomiting, back

pain, or headaches.


 (Regina Wilson)





- Related Data


                                Home Medications











 Medication  Instructions  Recorded  Confirmed


 


Prenatal Vit No.179/Iron/Folic 1 tab PO DAILY 23





[Prenatal Tablet]   


 


HYDROcodone/APAP 5-325MG [Norco 1 - 2 tab PO Q6H PRN 23





5-325]   


 


metroNIDAZOLE [Flagyl] 500 mg PO BID 23











                                    Allergies











Allergy/AdvReac Type Severity Reaction Status Date / Time


 


ibuprofen [From Motrin] AdvReac  Diarrhea Verified 23 21:16


 


morphine AdvReac  Vomiting Verified 23 21:16














Review of Systems


ROS Other: All systems not noted in ROS Statement are negative.





<Manohar Martinez - Last Filed: 23 14:26>


ROS Other: All systems not noted in ROS Statement are negative.





<Regina Wilson - Last Filed: 23 22:53>


ROS Statement: 


Those systems with pertinent positive or pertinent negative responses have been 

documented in the HPI.








Past Medical History


Past Medical History: No Reported History


History of Any Multi-Drug Resistant Organisms: None Reported


Past Surgical History: No Surgical Hx Reported


Additional Past Surgical History / Comment(s): knee surgery+ 2006,2005, c 

section


Past Anesthesia/Blood Transfusion Reactions: No Reported Reaction


Past Psychological History: No Psychological Hx Reported


Smoking Status: Never smoker


Past Alcohol Use History: None Reported


Past Drug Use History: None Reported





- Past Family History


  ** Mother


Family Medical History: No Reported History


Additional Family Medical History / Comment(s): breast cancer serviver





<Manohar Martinez - Last Filed: 23 14:26>





General Exam


Limitations: no limitations


General appearance: alert, in no apparent distress





<Manohar Martinez - Last Filed: 23 14:26>


Limitations: no limitations


General appearance: alert, in no apparent distress


Head exam: Present: atraumatic, normocephalic, normal inspection


Respiratory exam: Present: normal lung sounds bilaterally.  Absent: respiratory 

distress, wheezes, rales, rhonchi, stridor


Cardiovascular Exam: Present: regular rate, normal rhythm, normal heart sounds. 

Absent: systolic murmur, diastolic murmur, rubs, gallop, clicks


GI/Abdominal exam: Present: soft, tenderness (Epigastric), hyperactive bowel 

sounds.  Absent: distended


Neurological exam: Present: alert, oriented X3, CN II-XII intact


Psychiatric exam: Present: normal affect, normal mood


Skin exam: Present: warm, dry, intact, normal color.  Absent: rash





<Regina Wilson - Last Filed: 23 22:53>





- General Exam Comments


Initial Comments: 


Visual Physical Exam





Vital signs reviewed





General: Well-appearing, nontoxic, no acute distress.


Head: Normocephalic, atraumatic


Eyes: PERRLA, EOMI


ENT: Airway patent


Chest: Nonlabored breathing


Skin: No visual rash, normal skin tone


Neuro: Alert and oriented 3


Musculoskeletal: No gross abnormalities


 (Manohar Martinez)





Course


                                   Vital Signs











  23





  14:19 21:36


 


Temperature 98.2 F 


 


Pulse Rate 69 60


 


Respiratory 18 18





Rate  


 


Blood Pressure 130/73 127/78


 


O2 Sat by Pulse 99 97





Oximetry  














Medical Decision Making





- Lab Data


Result diagrams: 


                                 23 15:37





                                 23 15:37





- Radiology Data


Radiology results: report reviewed, image reviewed





<Regina Wilson - Last Filed: 23 22:53>





- Medical Decision Making


This is a 38-year-old female who presents to the emergency department for 

diarrhea.





Was pt. sent in by a medical professional or institution?


@  -Yes, [facility]


Did you speak to anyone other than the patient for history?  


@  -Dr. Matos, ED attending, spoke with [facility] regarding the 

patient's arrival.


Did you review nursing and triage notes? 


@  -Yes, and I agree, it is accurate with regards to the patient's symptoms.


Were old charts reviewed? 


@  -OB/GYN discharge summary from 23.


Differential Diagnosis? 


@  -Differential Diarrhea:


C. diff, gastroenteritis, food borne illness, this is not meant to be an all-

inclusive list. 


EKG interpreted by me (3pts min.)?


@  -Not obtained


X-rays interpreted by me (1pt min.)?


@  -Not obtained


CT interpreted by me (1pt min.)?


@  -Computed tomography scan of the abdomen and pelvis obtained.  My 

interpretation identifies thickening of the transverse and descending colon.


U/S interpreted by me (1pt. min.)?


@  -Not interpreted by me.


What testing was considered but not performed? (CT, X-rays, U/S, labs)? Why?


@  -None


What meds were considered but not given? Why?


@  -None


Did you discuss the management of the patient with other professionals?


@  -Yes, Dr. Calderon, who advised that the gallbladder wall is not considered 

thickened at 1.3cm and there is no indication for operative intervention at this

time. I then spoke with Dr. Jovel, who accepts the patient for admission. 


Did you reconcile home meds?


@  -No


Was smoking cessation discussed for >3mins.?


@  -No


Was critical care preformed (if so, how long)?


@  -No


Were there social determinants of health that impacted care today? How? 

(Homelessness, low income, unemployed, alcoholism, drug addiction, 

transportation, low edu. Level, literacy, decrease access to med. care, senior living, 

rehab)?


@  -No


Was there de-escalation of care discussed even if they declined? (Discuss DNR or

withdrawal of care, Hospice)?


@  -No


What co-morbidities impacted this encounter? (DM, HTN, Smoking, COPD, CAD, 

Cancer, CVA, Hep., AIDS, mental health diagnosis, sleep apnea, morbid obesity)?


@  -None


Was patient admitted / discharged?


@  -Admitted.  Lab work obtained and found to be nonactionable.  Patient is 

positive for C. diff.  Computed tomography scan of the abdomen and pelvis 

obtained revealing a diffusely thickened transverse and proximal descending colo

n.  There was also concern for cholecystitis, and a gallbladder ultrasound was 

subsequently obtained.  This revealed thickening of the gallbladder wall with 

pericholecystic fluid and advised clinical correlation for cholecystitis.  This 

was discussed with Dr. Calderon, who states that the listed thickness of the 

gallbladder wall at 1.3 cm is not considered thickened, and there is no 

indication to take her to surgery at this time.  Patient does have epigastric 

pain, however given the location of the thickened colon with the positive C. 

diff infection, it is unclear whether this may be related to the gallbladder or 

the C. diff.  She was started on oral vancomycin and IV fluids.  When discussing

disposition, patient continues to remain very uncomfortable and does not want to

get her  sick.  Patient admitted to medicine for management of C. diff 

infection.  General surgery listed as consult for further evaluation of possible

gallbladder problems.  


Undiagnosed new problem with uncertain prognosis?


@  -None


Drug Therapy requiring intensive monitoring for toxicity (Heparin, Nitro, 

Insulin, Cardizem)?


@  -None


Were any procedures done?


@  -None


Diagnosis/symptom?


@  -C. diff colitis


Acute, or Chronic, or Acute on Chronic?


@  -Acute


Uncomplicated (without systemic symptoms) or Complicated (systemic symptoms)?


@  -Complicated


Side effects of treatment?


@  -None


Exacerbation, Progression, or Severe Exacerbation]


@  -Not applicable


Poses a threat to life or bodily function?


@  -Yes





This case was discussed in detail with the attending ED physician, Dr. Matos.

Presentation, findings, and treatment plan discussed in detail as well. 


 (Regina Wilson)





- Lab Data


                                   Lab Results











  23 Range/Units





  15:37 15:37 15:37 


 


WBC  6.0    (3.8-10.6)  k/uL


 


RBC  3.03 L    (3.80-5.40)  m/uL


 


Hgb  9.5 L    (11.4-16.0)  gm/dL


 


Hct  27.6 L    (34.0-46.0)  %


 


MCV  91.1    (80.0-100.0)  fL


 


MCH  31.4    (25.0-35.0)  pg


 


MCHC  34.5    (31.0-37.0)  g/dL


 


RDW  13.0    (11.5-15.5)  %


 


Plt Count  202    (150-450)  k/uL


 


MPV  7.7    


 


Neutrophils %  73    %


 


Lymphocytes %  18    %


 


Monocytes %  7    %


 


Eosinophils %  1    %


 


Basophils %  0    %


 


Neutrophils #  4.4    (1.3-7.7)  k/uL


 


Lymphocytes #  1.1    (1.0-4.8)  k/uL


 


Monocytes #  0.4    (0-1.0)  k/uL


 


Eosinophils #  0.0    (0-0.7)  k/uL


 


Basophils #  0.0    (0-0.2)  k/uL


 


Sodium    136 L  (137-145)  mmol/L


 


Potassium    3.5  (3.5-5.1)  mmol/L


 


Chloride    106  ()  mmol/L


 


Carbon Dioxide    22  (22-30)  mmol/L


 


Anion Gap    8  mmol/L


 


BUN    12  (7-17)  mg/dL


 


Creatinine    0.54  (0.52-1.04)  mg/dL


 


Est GFR (CKD-EPI)AfAm    >90  (>60 ml/min/1.73 sqM)  


 


Est GFR (CKD-EPI)NonAf    >90  (>60 ml/min/1.73 sqM)  


 


Glucose    99  (74-99)  mg/dL


 


Plasma Lactic Acid Gordon     (0.7-2.0)  mmol/L


 


Calcium    8.5  (8.4-10.2)  mg/dL


 


Magnesium    1.6  (1.6-2.3)  mg/dL


 


Total Bilirubin    0.3  (0.2-1.3)  mg/dL


 


AST    22  (14-36)  U/L


 


ALT    31  (4-34)  U/L


 


Alkaline Phosphatase    72  ()  U/L


 


Total Protein    5.4 L  (6.3-8.2)  g/dL


 


Albumin    3.0 L  (3.5-5.0)  g/dL


 


Lipase    66  ()  U/L


 


Urine Color   Yellow   


 


Urine Appearance   Cloudy H   (Clear)  


 


Urine pH   5.5   (5.0-8.0)  


 


Ur Specific Gravity   1.019   (1.001-1.035)  


 


Urine Protein   Trace H   (Negative)  


 


Urine Glucose (UA)   Negative   (Negative)  


 


Urine Ketones   Negative   (Negative)  


 


Urine Blood   Large H   (Negative)  


 


Urine Nitrite   Negative   (Negative)  


 


Urine Bilirubin   Negative   (Negative)  


 


Urine Urobilinogen   <2.0   (<2.0)  mg/dL


 


Ur Leukocyte Esterase   Large H   (Negative)  


 


Urine RBC   >182 H   (0-5)  /hpf


 


Urine WBC   98 H   (0-5)  /hpf


 


Ur Squamous Epith Cells   4   (0-4)  /hpf


 


Urine Bacteria   Rare H   (None)  /hpf


 


Urine Mucus   Occasional H   (None)  /hpf


 


C. difficile (EIA) Intrp     (Negative)  














  23 Range/Units





  15:37 18:44 


 


WBC    (3.8-10.6)  k/uL


 


RBC    (3.80-5.40)  m/uL


 


Hgb    (11.4-16.0)  gm/dL


 


Hct    (34.0-46.0)  %


 


MCV    (80.0-100.0)  fL


 


MCH    (25.0-35.0)  pg


 


MCHC    (31.0-37.0)  g/dL


 


RDW    (11.5-15.5)  %


 


Plt Count    (150-450)  k/uL


 


MPV    


 


Neutrophils %    %


 


Lymphocytes %    %


 


Monocytes %    %


 


Eosinophils %    %


 


Basophils %    %


 


Neutrophils #    (1.3-7.7)  k/uL


 


Lymphocytes #    (1.0-4.8)  k/uL


 


Monocytes #    (0-1.0)  k/uL


 


Eosinophils #    (0-0.7)  k/uL


 


Basophils #    (0-0.2)  k/uL


 


Sodium    (137-145)  mmol/L


 


Potassium    (3.5-5.1)  mmol/L


 


Chloride    ()  mmol/L


 


Carbon Dioxide    (22-30)  mmol/L


 


Anion Gap    mmol/L


 


BUN    (7-17)  mg/dL


 


Creatinine    (0.52-1.04)  mg/dL


 


Est GFR (CKD-EPI)AfAm    (>60 ml/min/1.73 sqM)  


 


Est GFR (CKD-EPI)NonAf    (>60 ml/min/1.73 sqM)  


 


Glucose    (74-99)  mg/dL


 


Plasma Lactic Acid Gordon   1.0  (0.7-2.0)  mmol/L


 


Calcium    (8.4-10.2)  mg/dL


 


Magnesium    (1.6-2.3)  mg/dL


 


Total Bilirubin    (0.2-1.3)  mg/dL


 


AST    (14-36)  U/L


 


ALT    (4-34)  U/L


 


Alkaline Phosphatase    ()  U/L


 


Total Protein    (6.3-8.2)  g/dL


 


Albumin    (3.5-5.0)  g/dL


 


Lipase    ()  U/L


 


Urine Color    


 


Urine Appearance    (Clear)  


 


Urine pH    (5.0-8.0)  


 


Ur Specific Gravity    (1.001-1.035)  


 


Urine Protein    (Negative)  


 


Urine Glucose (UA)    (Negative)  


 


Urine Ketones    (Negative)  


 


Urine Blood    (Negative)  


 


Urine Nitrite    (Negative)  


 


Urine Bilirubin    (Negative)  


 


Urine Urobilinogen    (<2.0)  mg/dL


 


Ur Leukocyte Esterase    (Negative)  


 


Urine RBC    (0-5)  /hpf


 


Urine WBC    (0-5)  /hpf


 


Ur Squamous Epith Cells    (0-4)  /hpf


 


Urine Bacteria    (None)  /hpf


 


Urine Mucus    (None)  /hpf


 


C. difficile (EIA) Intrp  Positive A   (Negative)  














Disposition





<Manohar Martinez - Last Filed: 23 14:26>





<Regian Wilson - Last Filed: 23 22:53>


Clinical Impression: 


 C. difficile colitis





Disposition: ADMITTED AS IP TO THIS HOSP

## 2023-05-31 RX ADMIN — CHOLESTYRAMINE SCH GM: 4 POWDER, FOR SUSPENSION ORAL at 14:35

## 2023-05-31 RX ADMIN — ONDANSETRON PRN MG: 2 INJECTION INTRAMUSCULAR; INTRAVENOUS at 09:43

## 2023-05-31 RX ADMIN — CEFAZOLIN SCH MLS/HR: 330 INJECTION, POWDER, FOR SOLUTION INTRAMUSCULAR; INTRAVENOUS at 20:23

## 2023-05-31 RX ADMIN — FAMOTIDINE SCH MG: 20 TABLET, FILM COATED ORAL at 14:35

## 2023-05-31 RX ADMIN — CEFAZOLIN SCH MLS/HR: 330 INJECTION, POWDER, FOR SOLUTION INTRAMUSCULAR; INTRAVENOUS at 05:48

## 2023-05-31 RX ADMIN — ACETAMINOPHEN PRN MG: 325 TABLET, FILM COATED ORAL at 01:22

## 2023-05-31 RX ADMIN — VANCOMYCIN HYDROCHLORIDE SCH MG: 125 CAPSULE ORAL at 12:00

## 2023-05-31 RX ADMIN — CHOLESTYRAMINE SCH GM: 4 POWDER, FOR SUSPENSION ORAL at 20:23

## 2023-05-31 RX ADMIN — ACETAMINOPHEN PRN MG: 325 TABLET, FILM COATED ORAL at 07:56

## 2023-05-31 RX ADMIN — FAMOTIDINE SCH MG: 20 TABLET, FILM COATED ORAL at 20:23

## 2023-05-31 RX ADMIN — POTASSIUM CHLORIDE SCH MEQ: 20 TABLET, EXTENDED RELEASE ORAL at 14:41

## 2023-05-31 RX ADMIN — KETOROLAC TROMETHAMINE PRN MG: 15 INJECTION, SOLUTION INTRAMUSCULAR; INTRAVENOUS at 15:56

## 2023-05-31 RX ADMIN — HYDROMORPHONE HYDROCHLORIDE PRN MG: 1 INJECTION, SOLUTION INTRAMUSCULAR; INTRAVENOUS; SUBCUTANEOUS at 01:22

## 2023-05-31 RX ADMIN — VANCOMYCIN HYDROCHLORIDE SCH MG: 125 CAPSULE ORAL at 18:40

## 2023-05-31 RX ADMIN — HYDROMORPHONE HYDROCHLORIDE PRN MG: 1 INJECTION, SOLUTION INTRAMUSCULAR; INTRAVENOUS; SUBCUTANEOUS at 09:43

## 2023-05-31 RX ADMIN — KETOROLAC TROMETHAMINE PRN MG: 15 INJECTION, SOLUTION INTRAMUSCULAR; INTRAVENOUS at 22:39

## 2023-05-31 RX ADMIN — POTASSIUM CHLORIDE SCH MEQ: 20 TABLET, EXTENDED RELEASE ORAL at 13:10

## 2023-05-31 RX ADMIN — ONDANSETRON PRN MG: 2 INJECTION INTRAMUSCULAR; INTRAVENOUS at 01:22

## 2023-05-31 RX ADMIN — CEFAZOLIN SCH MLS/HR: 330 INJECTION, POWDER, FOR SOLUTION INTRAMUSCULAR; INTRAVENOUS at 10:54

## 2023-05-31 RX ADMIN — HYDROMORPHONE HYDROCHLORIDE PRN MG: 1 INJECTION, SOLUTION INTRAMUSCULAR; INTRAVENOUS; SUBCUTANEOUS at 16:55

## 2023-05-31 RX ADMIN — ACETAMINOPHEN PRN MG: 325 TABLET, FILM COATED ORAL at 14:35

## 2023-05-31 RX ADMIN — HYDROMORPHONE HYDROCHLORIDE PRN MG: 1 INJECTION, SOLUTION INTRAMUSCULAR; INTRAVENOUS; SUBCUTANEOUS at 05:47

## 2023-05-31 RX ADMIN — HYDROMORPHONE HYDROCHLORIDE PRN MG: 1 INJECTION, SOLUTION INTRAMUSCULAR; INTRAVENOUS; SUBCUTANEOUS at 13:13

## 2023-05-31 RX ADMIN — CEFAZOLIN SCH MLS/HR: 330 INJECTION, POWDER, FOR SOLUTION INTRAMUSCULAR; INTRAVENOUS at 14:37

## 2023-05-31 RX ADMIN — HYDROMORPHONE HYDROCHLORIDE PRN MG: 1 INJECTION, SOLUTION INTRAMUSCULAR; INTRAVENOUS; SUBCUTANEOUS at 22:35

## 2023-05-31 NOTE — P.HPIM
History of Present Illness


H&P Date: 23














This is a pleasant 38-year-old female who presented to the emergency department 

after being discharged one day previous after  that was done on 

2023 here with a full-term baby.  Patient is not breast-feeding and 

history clear formula feeding and did have some postoperative fevers after C-

section was maintained on IV antibiotics along with a Z-Karlos prior to 

hospitalization for an upper respiratory infection.  Patient presented to the ER

with further multiple episodes of diarrhea and abdominal pain.  Patient reports 

her incision site is clean and dry with no drainage or purulence noted.  Patient

denies any fevers at home.  Patient not really taking in much oral intake and 

has not been eating very well.  Patient was started on vancomycin in the 

emergency department.  Patient was admitted for C. diff infection.  Given 

patient just delivered a few days ago and was on antibiotics we'll consult 

infectious disease along with OB/GYN and appreciate input and recommendations.  

Patient is having continued abdominal pain maintained on Dilaudid and recommend 

other alternatives as patient also is experiencing a headache and will add 

Tylenol.  Patient also having some nausea and will increase Zofran.  Patient did

have a gallbladder ultrasound along with abdominal CT which showed a diffusely 

thickened ascending transverse and proximal descending colon to correlate for 

colitis along with subcutaneous emphysema along the anterior lateral regions 

most likely related to recent  with gallstone and/or cholecystitis.  

Abdominal ultrasound was done of the gallbladder showing markedly thickened 

gallbladder walls with some periCholecystic fluid that may be present and clin

ical correlation for acute cholecystitis.  General surgery was consulted and 

pending.  Patient was started on IV hydration and a regular diet.





Review Of Systems:


Constitutional: Reports some fever prior to admission, no chills, no night 

sweats.  No weight change.  Reports of weakness, fatigue or lethargy.  No 

daytime sleepiness.


EENT: Reports headache.  No blurred vision or double vision, no loss of vision. 

No loss of Hearing, no ringing in the ears, no dizziness.  No nasal drainage or 

congestion.  No epistaxis.  No sore throat.


Lungs: No shortness of breath, cough, no sputum production.  No wheezing.


Cardiovascular: No chest pain, no lower extremity edema.  No palpitations.  No 

paroxysmal nocturnal dyspnea.  No orthopnea.  No lightheadedness or dizziness.  

No syncopal episodes.


Abdominal: Reports lower abdominal pain.  Reports nausea, vomiting.  Reports 

multiple episodes of diarrhea.  No constipation.  No bloody or tarry stools..  

Reports loss of appetite.


Genitourinary: No dysuria, increased frequency, urgency.  No urinary retention.


Musculoskeletal: No myalgias.  No muscle weakness, no gait dysfunction, no 

frequent falls.  No back pain.  No neck pain.


Integumentary: No wounds, no lesions.  No rash or pruritus.  No unusual 

bruising.  No change in hair or nails.


Neurologic: No aphasia. No facial droop. No change in mentation. No head injury.

No headache. No paralysis. No paresthesia.


Psychiatric: No depression.  No anxiety.  No mood swings.


Endocrine: No abnormal blood sugars.  No weight change.  No excessive sweating 

or thirst.  No cold intolerance.  











PHYSICAL EXAMINATION: 





GENERAL: The patient is alert and oriented x4,  Well developed, well nourished. 


HEENT: Pupils are round and equally reacting to light. EOMI. no scleral icterus.

No conjunctival pallor. Normocephalic, atraumatic. No pharyngeal erythema. No 

thyromegaly.  


CARDIOVASCULAR: S1 and S2  muffled 


PULMONARY: diminished breath sounds bilaterally with no wheezing or rhonchi 

noted. 


ABDOMEN: soft.  tender on exam of the left and right lower quadrant.  Status 

post  surgical site is dry and intact with no drainage noted. non-

distended, normoactive bowel sounds. No palpable organomegaly. 


MUSCULOSKELETAL: No joint swelling or deformity.


EXTREMITIES: No cyanosis, clubbing, or pedal edema.  


NEUROLOGICAL: Gross neurological examination did not reveal any focal deficits. 




SKIN: No rashes. 





Assessment:





Acute C. diff colitis status post recent antibiotics for postoperative fever 

from 


Acute hospitalization with  delivery on 2023


Gallbladder wall thickening with concerns of possible acute cholecystitis


Hypokalemia


Hypomagnesemia


Former smoker


GI prophylaxis


DVT prophylaxis


Full code





Plan:





Recommend to continue with current medications and management with multiple 

medical consultations following.  Infectious disease along with OB/GYN and 

general surgery consulted.


Patient did have ultrasound of the gallbladder which showed some gallbladder 

wall thickening with concerns of acute cholecystitis and will await surgical 

consult and appreciate input and recommendations.


Patient has been started on oral vancomycin for C. diff which was positive and 

patient did recently have hospitalization for  and a postoperative 

fever maintained on IV antibiotics and also completed a short course of 

Zithromax for an upper respiratory infection prior to hospitalization


Patient with nausea will increase Zofran and continue gentle IV hydration


Potassium slightly low at 3.5 and magnesium in the ER was 1.6 and will replace 

and follow up with repeat labs








The impression and plan of care has been dictated by Rox Davis, nurse 

practitioner as directed.





Dr. Amina MD


I have performed a history and examination and MDM of this patient, discussed 

the same with the dictator, and  agree with the dictator's assessment and plan 

as written ,documented as a scribe. Based on total visit time,  I have performed

more than 50% of the visit. Any additional findings or plans will be noted. 





Past Medical History


Past Medical History: No Reported History


Additional Past Medical History / Comment(s): Ulcerative colitis


History of Any Multi-Drug Resistant Organisms: C-DIFF


Date of last positivie culture/infection: 2023


MDRO Source:: Stool


Past Surgical History: No Surgical Hx Reported


Additional Past Surgical History / Comment(s): knee surgery+ ,2005, c 

section


Past Anesthesia/Blood Transfusion Reactions: No Reported Reaction


Past Psychological History: No Psychological Hx Reported


Smoking Status: Former smoker


Past Alcohol Use History: None Reported


Past Drug Use History: None Reported





- Past Family History


  ** Mother


Family Medical History: No Reported History


Additional Family Medical History / Comment(s): breast cancer survivor





Medications and Allergies


                                Home Medications











 Medication  Instructions  Recorded  Confirmed  Type


 


Prenatal Vit No.179/Iron/Folic 1 tab PO DAILY 23 History





[Prenatal Tablet]    


 


HYDROcodone/APAP 5-325MG [Norco 1 - 2 tab PO Q6H PRN 23 History





5-325]    


 


metroNIDAZOLE [Flagyl] 500 mg PO BID 23 History








                                    Allergies











Allergy/AdvReac Type Severity Reaction Status Date / Time


 


ibuprofen [From Motrin] AdvReac  Diarrhea Verified 23 21:16


 


morphine AdvReac  Vomiting Verified 23 21:16














Physical Exam


Vitals: 


                                   Vital Signs











  Temp Pulse Pulse Resp BP BP Pulse Ox


 


 23 02:00  96.9 F L   57 L  13   123/68  95


 


 23 00:21   70   18  119/77   95


 


 23 21:36   60   18  127/78   97


 


 23 14:19  98.2 F  69   18  130/73   99








                                Intake and Output











 23





 22:59 06:59 14:59


 


Intake Total  780 


 


Balance  780 


 


Intake:   


 


  IV  780 


 


    Sodium Chloride 0.9% 1,  780 





    000 ml @ 130 mls/hr IV .   





    [id number] [facility] Rx#:[id number]   


 


Other:   


 


  Voiding Method  Toilet 


 


  # Voids  2 


 


  Weight 90.718 kg  














Results


CBC & Chem 7: 


                                 23 15:37





                                 23 15:37


Labs: 


                  Abnormal Lab Results - Last 24 Hours (Table)











  23 Range/Units





  15:37 15:37 15:37 


 


RBC  3.03 L    (3.80-5.40)  m/uL


 


Hgb  9.5 L    (11.4-16.0)  gm/dL


 


Hct  27.6 L    (34.0-46.0)  %


 


Sodium    136 L  (137-145)  mmol/L


 


Total Protein    5.4 L  (6.3-8.2)  g/dL


 


Albumin    3.0 L  (3.5-5.0)  g/dL


 


Urine Appearance   Cloudy H   (Clear)  


 


Urine Protein   Trace H   (Negative)  


 


Urine Blood   Large H   (Negative)  


 


Ur Leukocyte Esterase   Large H   (Negative)  


 


Urine RBC   >182 H   (0-5)  /hpf


 


Urine WBC   98 H   (0-5)  /hpf


 


Urine Bacteria   Rare H   (None)  /hpf


 


Urine Mucus   Occasional H   (None)  /hpf


 


C. difficile (EIA) Intrp     (Negative)  














  23 Range/Units





  15:37 


 


RBC   (3.80-5.40)  m/uL


 


Hgb   (11.4-16.0)  gm/dL


 


Hct   (34.0-46.0)  %


 


Sodium   (137-145)  mmol/L


 


Total Protein   (6.3-8.2)  g/dL


 


Albumin   (3.5-5.0)  g/dL


 


Urine Appearance   (Clear)  


 


Urine Protein   (Negative)  


 


Urine Blood   (Negative)  


 


Ur Leukocyte Esterase   (Negative)  


 


Urine RBC   (0-5)  /hpf


 


Urine WBC   (0-5)  /hpf


 


Urine Bacteria   (None)  /hpf


 


Urine Mucus   (None)  /hpf


 


C. difficile (EIA) Intrp  Positive A  (Negative)  














Thrombosis Risk Factor Assmnt





- DVT/VTE Prophylaxis


DVT/VTE Prophylaxis: Mechanical Prophylaxis ordered





- Choose All That Apply


Any of the Below Risk Factors Present?: Yes


Each Factor Represents 1 point: Pregnancy or Postpartum


Other Risk Factors: No


Thrombosis Risk Factor Assessment Total Risk Factor Score: 1


Thrombosis Risk Factor Assessment Level: Low Risk





Assessment and Plan


Time with Patient: Greater than 30

## 2023-05-31 NOTE — P.OBCN
History of Present Illness


Consult date: 23


Reason for consult: other (s/p LTCS  )


Chief complaint: Watery diarrhea,  dehydration


History of present illness: 





This is a 38-year-old G2 now P2 status post primary  on .  Patient 

had noted increased diarrhea over the weekend, which increased yesterday 

immensely.  Patient was concerned about dehydration symptoms therefore presented

to the emergency department.  Patient was diagnosed with C. diff, and started on

vancomycin.


Patient notes minimal change in the diarrhea today.  She states she is feeling 

better with IV fluids.


She notes her lochia to be moderate.  She she states that she is unsure if her 

milk came in, she noted heaviness in the chest, no leakage from the breast.


Patient denies concerns with her  scar, she states her pain from the C-

section is well-controlled.





Review of Systems


Constitutional: Reports fatigue, Denies fever


Ears, nose, mouth and throat: Denies headache


Cardiovascular: Reports leg edema


Respiratory: Denies dyspnea


Gastrointestinal: Reports abdominal pain, Reports bloating, Reports diarrhea


Genitourinary: Denies pregnant





Past Medical History


Past Medical History: No Reported History


Additional Past Medical History / Comment(s): Ulcerative colitis


History of Any Multi-Drug Resistant Organisms: C-DIFF


Year Discovered:: 2023


MDRO Source:: Stool


Past Surgical History: No Surgical Hx Reported


Additional Past Surgical History / Comment(s): knee surgery+ ,, c 

section


Past Anesthesia/Blood Transfusion Reactions: No Reported Reaction


Past Psychological History: No Psychological Hx Reported


Smoking Status: Former smoker


Past Alcohol Use History: None Reported


Past Drug Use History: None Reported





- Past Family History


  ** Mother


Family Medical History: No Reported History


Additional Family Medical History / Comment(s): breast cancer survivor





Medications and Allergies


                                Home Medications











 Medication  Instructions  Recorded  Confirmed  Type


 


Prenatal Vit No.179/Iron/Folic 1 tab PO DAILY 23 History





[Prenatal Tablet]    


 


HYDROcodone/APAP 5-325MG [Norco 1 - 2 tab PO Q6H PRN 23 History





5-325]    


 


metroNIDAZOLE [Flagyl] 500 mg PO BID 23 History








                                    Allergies











Allergy/AdvReac Type Severity Reaction Status Date / Time


 


ibuprofen [From Motrin] AdvReac  Diarrhea Verified 23 21:16


 


morphine AdvReac  Vomiting Verified 23 21:16














Exam


Osteopathic Statement: *.  No significant issues noted on an osteopathic 

structural exam other than those noted in the History and Physical/Consult.


                                   Vital Signs











  Temp Pulse Pulse Resp BP BP Pulse Ox


 


 23 14:39  97.6 F   66  16   130/62  96


 


 23 08:30  98.4 F   68  17   116/94  96


 


 23 02:00  96.9 F L   57 L  13   123/68  95


 


 23 00:21   70   18  119/77   95


 


 23 21:36   60   18  127/78   97








                                Intake and Output











 23





 06:59 14:59 22:59


 


Intake Total 780 300 


 


Balance 780 300 


 


Intake:   


 


   300 


 


    Sodium Chloride 0.9% 1, 780 300 





    000 ml @ 130 mls/hr IV .   





    Q7H42M [facility] Rx#:[id number]   


 


Other:   


 


  Voiding Method Toilet  


 


  # Voids 2  


 


  # Bowel Movements  10 














Targeted physical exam is performed on this date, in general she appears 

fatigued, in no acute distress, breathing is noted to be nonlabored, heart has a

regular rate and rhythm, abdomen is soft  scars intact with Steri-

Strips in place.  Some discomfort is noted on palpation.





Results


Result Diagrams: 


                                 23 15:37





                                 23 15:37


                  Abnormal Lab Results - Last 24 Hours (Table)











  23 Range/Units





  15:37 15:37 


 


Sodium  136 L   (137-145)  mmol/L


 


Total Protein  5.4 L   (6.3-8.2)  g/dL


 


Albumin  3.0 L   (3.5-5.0)  g/dL


 


C. difficile (EIA) Intrp   Positive A  (Negative)  














Assessment and Plan


(1) C. difficile colitis


Current Visit: Yes   Status: Acute   Code(s): A04.72 - ENTEROCOLITIS D/T 

CLOSTRIDIUM DIFFICILE, NOT SPCF AS RECUR   SNOMED Code(s): 919741200


   





(2) S/P  section


Current Visit: No   Status: Acute   Code(s): Z98.891 - HISTORY OF UTERINE SCAR 

FROM PREVIOUS SURGERY   SNOMED Code(s): 450771302


   


Plan: 





38-year-old G2 now P2 status post primary  on .  Patient did have 2

rounds of antibiotics a Z-Karlos prior to admission for delivery for upper 

respiratory symptoms and while on labor and delivery she received antibiotics 

for maternal fever.  Patient does have a prior history of colitis many years 

ago.  


General surgery is consulted and recommendations are  appreciated thank you.





Postpartum care is discussed with patient and her  at the bedside.  All 

questions are answered from an OB perspective.

## 2023-05-31 NOTE — P.GSCN
History of Present Illness


Consult date: 23


History of present illness: 





CHIEF COMPLAINT: Abdominal pain and diarrhea





HISTORY OF PRESENT ILLNESS: This is a 38-year-old female who is status post C-

section on 2023 she developed a fever and was having diarrhea.  She 

received antibiotics and they did that she had been about 36 hours without a 

fever and had been doing better was discharged yesterday from the hospital.  She

went home and had increase in diarrhea and pain across the upper abdomen.  

Patient reports no blood in the stools.  She is having multiple episodes of 

diarrhea.  She had also been on antibiotics, Z-Karlos prior to her  for 

upper respiratory infection.  Patient has been found to have C. diff colitis and

is on oral vancomycin.  She had a computed tomography scan completed that shows 

diffusely thickened ascending transverse and proximal descending colon.  

Correlate for colitis.  Gallstones and/or cholecystitis.  Clinical correlation 

recommended.  Patient then had gallbladder ultrasound reports the wall.  His 

markedly thickened but is measuring at 1.3 cm and some minimal pericholecystic 

fluid may be present.  Clinical correlation recommended for acute cholecystitis.

 Surgical service consulted for suspected cholecystitis.  Patient denies any 

prior history of C. diff colitis.





PAST MEDICAL HISTORY: 


See below





PAST SURGICAL HISTORY: 


See below





MEDICATIONS: 


See below





ALLERGIES: 


See below





SOCIAL HISTORY: No illicit drug use.  





REVIEW OF SYSTEMS: 


CONSTITUTIONAL: Denies fever or chills.


HEENT: Denies blurred vision, vision changes, or eye pain. Denies hemoptysis 


CARDIOVASCULAR: Denies chest pain or pressure.


RESPIRATORY: No shortness of breath. 


GASTROINTESTINAL: See HPI for pertinent findings


HEMATOLOGIC: Denies bleeding disorders.


GENITOURINARY:  Denies any blood in urine or increased urinary frequency.  


SKIN: Denies pruitis. Denies rash.





PHYSICAL EXAM: 


VITAL SIGNS: Reviewed


GENERAL: Well-developed in no acute distress. 


HEENT:  No sclera icterus. Extraocular movements grossly intact.  Moist buccal 

mucosa. Head is atraumatic, normocephalic. No nasal drainage.


ABDOMEN:  Soft.   Nondistended.  Mild Tenderness with palpation across the upper

abdomen.  no RUQ tenderness. tenderness lower abdomen. Patient did just receive 

pain meds


NEUROLOGIC:  Alert and oriented.  Cranial nerves II through XII grossly intact.





LABORATORY DATA:


WBC 6.0 HGB 9.5 plt 202


Na 136 K 3.5 cr 0.54


Lactic acid 1.0


LFTs normal


lipase 66


c.diff positive





IMAGING:


Computed tomography scan abdomen and pelvis diffusely thickened ascending 

transverse and proximal descending colon.  Correlate for colitis.  Subcutaneous 

emphysema along the anterior lateral regions may be related to recent .

 Gallstones and/or colitis.  Clinical correlation recommended.





Ultrasound gallbladder wall appears markedly thickened measuring at 1.3 cm.  

Some minimal pericholecystic fluid may be present.  Clinical correlation 

recommended for acute cholecystitis.





ASSESSMENT: 


1.  Abdominal pain with diarrhea likely due to C. diff colitis








PLAN: 


-Further recommendations forthcoming per surgeon


-Continue treatment for C. diff colitis


-Continue regular diet


-Continue supportive care





Physician Assistant note has been reviewed by physician. Signing provider agrees

with the documented findings, assessment, and plan of care. 





I have personally seen and examined the patient, reviewed the NP /PAs history, 

exam and MDM and agree with the assessment and plan as written. Based on total 

visit time, I have performed more than 50% of the visit.





As above:  Patient known to our service from previous ischemic colitis 20 years 

ago or so.  Patient says she was having upper mid abdominal crampy discomfort 

that reminded her of her previous colitis.  CAT scan reviewed and shows 

impressive bowel wall thickening involving the visualized colon.  Uterus is 

enlarged with post- changes suspected.  Continue antibiotics for C. 

difficile colitis.  I'm not concerned about the patient's gallbladder findings 

at this time.  We'll follow closely with you.





Past Medical History


Past Medical History: No Reported History


Additional Past Medical History / Comment(s): Ulcerative colitis


History of Any Multi-Drug Resistant Organisms: C-DIFF


Year Discovered:: 2023


MDRO Source:: Stool


Past Surgical History: No Surgical Hx Reported


Additional Past Surgical History / Comment(s): knee surgery+ 2006,2005, c 

section


Past Anesthesia/Blood Transfusion Reactions: No Reported Reaction


Past Psychological History: No Psychological Hx Reported


Smoking Status: Former smoker


Past Alcohol Use History: None Reported


Past Drug Use History: None Reported





- Past Family History


  ** Mother


Family Medical History: No Reported History


Additional Family Medical History / Comment(s): breast cancer survivor





Medications and Allergies


                                Home Medications











 Medication  Instructions  Recorded  Confirmed  Type


 


Prenatal Vit No.179/Iron/Folic 1 tab PO DAILY 23 History





[Prenatal Tablet]    


 


HYDROcodone/APAP 5-325MG [Norco 1 - 2 tab PO Q6H PRN 23 History





5-325]    


 


metroNIDAZOLE [Flagyl] 500 mg PO BID 23 History








                                    Allergies











Allergy/AdvReac Type Severity Reaction Status Date / Time


 


ibuprofen [From Motrin] AdvReac  Diarrhea Verified 23 21:16


 


morphine AdvReac  Vomiting Verified 23 21:16














Surgical - Exam


                                   Vital Signs











Temp Pulse Resp BP Pulse Ox


 


 98.2 F   69   18   130/73   99 


 


 23 14:19  23 14:19  23 14:19  23 14:19  23 14:19














Results





- Labs





                                 23 15:37





                                 23 15:37


                  Abnormal Lab Results - Last 24 Hours (Table)











  23 Range/Units





  15:37 15:37 15:37 


 


RBC  3.03 L    (3.80-5.40)  m/uL


 


Hgb  9.5 L    (11.4-16.0)  gm/dL


 


Hct  27.6 L    (34.0-46.0)  %


 


Sodium    136 L  (137-145)  mmol/L


 


Total Protein    5.4 L  (6.3-8.2)  g/dL


 


Albumin    3.0 L  (3.5-5.0)  g/dL


 


Urine Appearance   Cloudy H   (Clear)  


 


Urine Protein   Trace H   (Negative)  


 


Urine Blood   Large H   (Negative)  


 


Ur Leukocyte Esterase   Large H   (Negative)  


 


Urine RBC   >182 H   (0-5)  /hpf


 


Urine WBC   98 H   (0-5)  /hpf


 


Urine Bacteria   Rare H   (None)  /hpf


 


Urine Mucus   Occasional H   (None)  /hpf


 


C. difficile (EIA) Intrp     (Negative)  














  23 Range/Units





  15:37 


 


RBC   (3.80-5.40)  m/uL


 


Hgb   (11.4-16.0)  gm/dL


 


Hct   (34.0-46.0)  %


 


Sodium   (137-145)  mmol/L


 


Total Protein   (6.3-8.2)  g/dL


 


Albumin   (3.5-5.0)  g/dL


 


Urine Appearance   (Clear)  


 


Urine Protein   (Negative)  


 


Urine Blood   (Negative)  


 


Ur Leukocyte Esterase   (Negative)  


 


Urine RBC   (0-5)  /hpf


 


Urine WBC   (0-5)  /hpf


 


Urine Bacteria   (None)  /hpf


 


Urine Mucus   (None)  /hpf


 


C. difficile (EIA) Intrp  Positive A  (Negative)  








                                 Diabetes panel











  23 Range/Units





  15:37 


 


Sodium  136 L  (137-145)  mmol/L


 


Potassium  3.5  (3.5-5.1)  mmol/L


 


Chloride  106  ()  mmol/L


 


Carbon Dioxide  22  (22-30)  mmol/L


 


BUN  12  (7-17)  mg/dL


 


Creatinine  0.54  (0.52-1.04)  mg/dL


 


Glucose  99  (74-99)  mg/dL


 


Calcium  8.5  (8.4-10.2)  mg/dL


 


AST  22  (14-36)  U/L


 


ALT  31  (4-34)  U/L


 


Alkaline Phosphatase  72  ()  U/L


 


Total Protein  5.4 L  (6.3-8.2)  g/dL


 


Albumin  3.0 L  (3.5-5.0)  g/dL








                                  Calcium panel











  23 Range/Units





  15:37 


 


Calcium  8.5  (8.4-10.2)  mg/dL


 


Albumin  3.0 L  (3.5-5.0)  g/dL








                                 Pituitary panel











  23 Range/Units





  15:37 


 


Sodium  136 L  (137-145)  mmol/L


 


Potassium  3.5  (3.5-5.1)  mmol/L


 


Chloride  106  ()  mmol/L


 


Carbon Dioxide  22  (22-30)  mmol/L


 


BUN  12  (7-17)  mg/dL


 


Creatinine  0.54  (0.52-1.04)  mg/dL


 


Glucose  99  (74-99)  mg/dL


 


Calcium  8.5  (8.4-10.2)  mg/dL








                                  Adrenal panel











  23 Range/Units





  15:37 


 


Sodium  136 L  (137-145)  mmol/L


 


Potassium  3.5  (3.5-5.1)  mmol/L


 


Chloride  106  ()  mmol/L


 


Carbon Dioxide  22  (22-30)  mmol/L


 


BUN  12  (7-17)  mg/dL


 


Creatinine  0.54  (0.52-1.04)  mg/dL


 


Glucose  99  (74-99)  mg/dL


 


Calcium  8.5  (8.4-10.2)  mg/dL


 


Total Bilirubin  0.3  (0.2-1.3)  mg/dL


 


AST  22  (14-36)  U/L


 


ALT  31  (4-34)  U/L


 


Alkaline Phosphatase  72  ()  U/L


 


Total Protein  5.4 L  (6.3-8.2)  g/dL


 


Albumin  3.0 L  (3.5-5.0)  g/dL

## 2023-05-31 NOTE — P.CONS
History of Present Illness





- Reason for Consult


Consult date: 23


C. diff, postpartum


Requesting physician: Rox Davis





- Chief Complaint


Diarrhea and abdominal pain x few days





- History of Present Illness


Patient is a 38-year-old  female who recently did have a  on 

2023 patient did mention she did have a postoperative fever after 

and patient was maintained on IV biotherapy and the patient was subsequently 

discharged home on no antibiotics patient did mention that she did have a 

diarrhea started by the time she was getting discharged from the hospital 

however at home the patient did have multiple episodes of loose stools patient 

denies any blood or mucus in the stool has been complaining of crampy abdominal 

pain intensity is almost 7-8 out of 10 with no radiation has been nauseated but 

no vomiting with this and that the patient presented back to the hospital on 

arrival to the ER the patient was afebrile and no fever has been recorded 

subsequently patient did have a normal white count kidney function was normal 

liver enzymes are normal urine has been mildly positive patient stool for C. 

difficile came back positive patient did have a CT of abdominal pelvis diffusely

thickened ascending transverse and proximal descending colon correlate for 

colitis subcutaneous emphysema along the anterior lateral margins may be related

to recent  gallstone and or cholecystitis clinical correlation 

recommended patient was started on oral vancomycin infectious disease was 

consulted for further management of antibiotic therapy








Review of Systems


Positive point and negatives has been  mentioned in the HPI, complete review of 

systems was performed and all other systems are negative








Past Medical History


Past Medical History: No Reported History


Additional Past Medical History / Comment(s): Ulcerative colitis


History of Any Multi-Drug Resistant Organisms: C-DIFF


Year Discovered:: 2023


MDRO Source:: Stool


Past Surgical History: No Surgical Hx Reported


Additional Past Surgical History / Comment(s): knee surgery+ 2006,2005, c 

section


Past Anesthesia/Blood Transfusion Reactions: No Reported Reaction


Past Psychological History: No Psychological Hx Reported


Smoking Status: Former smoker


Past Alcohol Use History: None Reported


Past Drug Use History: None Reported





- Past Family History


  ** Mother


Family Medical History: No Reported History


Additional Family Medical History / Comment(s): breast cancer survivor





Medications and Allergies


                                Home Medications











 Medication  Instructions  Recorded  Confirmed  Type


 


Prenatal Vit No.179/Iron/Folic 1 tab PO DAILY 23 History





[Prenatal Tablet]    


 


HYDROcodone/APAP 5-325MG [Norco 1 - 2 tab PO Q6H PRN 23 History





5-325]    


 


Acetaminophen Tab [Tylenol] 650 mg PO Q6HR PRN  tab 23  Rx


 


Cholestyramine (with Sugar) 4 gm PO BID PRN #20 packet 23  Rx





[Questran]    


 


Famotidine [Pepcid] 20 mg PO BID 15 Days #30 tablet 23  Rx


 


Furosemide [Lasix] 40 mg PO DAILY PRN 3 Days #3 tablet 23  Rx


 


Vancomycin HCl [Vancocin HCl] 250 mg PO QID 14 Days #56 capsule 23  Rx


 


traMADol HCL 50 mg PO Q6H 3 Days #12 tab 23  Rx








                                    Allergies











Allergy/AdvReac Type Severity Reaction Status Date / Time


 


ibuprofen [From Motrin] AdvReac  Diarrhea Verified 23 21:16


 


morphine AdvReac  Vomiting Verified 23 21:16














Physical Exam


Vitals: 


                                   Vital Signs











  Temp Pulse Pulse Resp BP BP Pulse Ox


 


 23 08:30  98.4 F   68  17   116/94  96


 


 23 02:00  96.9 F L   57 L  13   123/68  95


 


 23 00:21   70   18  119/77   95


 


 23 21:36   60   18  127/78   97


 


 23 14:19  98.2 F  69   18  130/73   99








                                Intake and Output











 23





 22:59 06:59 14:59


 


Intake Total  780 


 


Balance  780 


 


Intake:   


 


  IV  780 


 


    Sodium Chloride 0.9% 1,  780 





    000 ml @ 130 mls/hr IV .   





    Q7H42M [facility] Rx#:[id number]   


 


Other:   


 


  Voiding Method  Toilet 


 


  # Voids  2 


 


  Weight 90.718 kg  











GENERAL DESCRIPTION: Middle-aged male lying in bed, no distress. No tachypnea or

accessory muscle of respiration use.


HEENT: Shows Pallor , no scleral icterus. Oral mucous membrane is dry.


NECK: Trachea central, no thyromegaly.


LUNGS: Unlabored breathing. Clear to auscultation anteriorly. No wheeze or 

crackle.


HEART: S1, S2, regular rate and rhythm. No loud murmur


ABDOMEN: Soft, mild tenderness , no guarding or rigidity,


EXTREMITIES: No edema of feet.


SKIN: No rash, no masses palpable.


NEUROLOGICAL: The patient is awake, alert, oriented x3, mood and affect normal.

















Results


CBC & Chem 7: 


                                 23 06:24





                                 23 05:57


Labs: 


                  Abnormal Lab Results - Last 24 Hours (Table)











  23 Range/Units





  15:37 15:37 15:37 


 


RBC  3.03 L    (3.80-5.40)  m/uL


 


Hgb  9.5 L    (11.4-16.0)  gm/dL


 


Hct  27.6 L    (34.0-46.0)  %


 


Sodium    136 L  (137-145)  mmol/L


 


Total Protein    5.4 L  (6.3-8.2)  g/dL


 


Albumin    3.0 L  (3.5-5.0)  g/dL


 


Urine Appearance   Cloudy H   (Clear)  


 


Urine Protein   Trace H   (Negative)  


 


Urine Blood   Large H   (Negative)  


 


Ur Leukocyte Esterase   Large H   (Negative)  


 


Urine RBC   >182 H   (0-5)  /hpf


 


Urine WBC   98 H   (0-5)  /hpf


 


Urine Bacteria   Rare H   (None)  /hpf


 


Urine Mucus   Occasional H   (None)  /hpf


 


C. difficile (EIA) Intrp     (Negative)  














  23 Range/Units





  15:37 


 


RBC   (3.80-5.40)  m/uL


 


Hgb   (11.4-16.0)  gm/dL


 


Hct   (34.0-46.0)  %


 


Sodium   (137-145)  mmol/L


 


Total Protein   (6.3-8.2)  g/dL


 


Albumin   (3.5-5.0)  g/dL


 


Urine Appearance   (Clear)  


 


Urine Protein   (Negative)  


 


Urine Blood   (Negative)  


 


Ur Leukocyte Esterase   (Negative)  


 


Urine RBC   (0-5)  /hpf


 


Urine WBC   (0-5)  /hpf


 


Urine Bacteria   (None)  /hpf


 


Urine Mucus   (None)  /hpf


 


C. difficile (EIA) Intrp  Positive A  (Negative)  














Assessment and Plan


(1) C. difficile colitis


Status: Acute   Code(s): A04.72 - ENTEROCOLITIS D/T CLOSTRIDIUM DIFFICILE, NOT 

SPCF AS RECUR   SNOMED Code(s): 471693726


   


Plan: 


1patient presented hospital with extensive diarrhea in this patient with a 

recent  for a full-term baby did have a postop fever and has been 

exposed to antibiotic CT did shows extensive colitis involving the transverse 

descending and sigmoid colon concerning for extensive C. difficile colitis due 

to likely etiology


2-we will increase the dose of vancomycin to 50 mg p.o. every 6 hours


3-we will add Questran for symptomatic relief


4-patient has been encouraged to increase probiotic and yogurt intake


We will follow on clinical condition and cultures to further adjust medication 

if needed


Thank you for this consultation we will follow the patient along with you





Time with Patient: Greater than 30

## 2023-06-01 LAB
ANION GAP SERPL CALC-SCNC: 6 MMOL/L
BASOPHILS # BLD AUTO: 0 K/UL (ref 0–0.2)
BASOPHILS NFR BLD AUTO: 0 %
BUN SERPL-SCNC: 14 MG/DL (ref 7–17)
CALCIUM SPEC-MCNC: 7.7 MG/DL (ref 8.4–10.2)
CHLORIDE SERPL-SCNC: 111 MMOL/L (ref 98–107)
CO2 SERPL-SCNC: 20 MMOL/L (ref 22–30)
EOSINOPHIL # BLD AUTO: 0.1 K/UL (ref 0–0.7)
EOSINOPHIL NFR BLD AUTO: 2 %
ERYTHROCYTE [DISTWIDTH] IN BLOOD BY AUTOMATED COUNT: 2.7 M/UL (ref 3.8–5.4)
ERYTHROCYTE [DISTWIDTH] IN BLOOD: 13 % (ref 11.5–15.5)
GLUCOSE SERPL-MCNC: 83 MG/DL (ref 74–99)
HCT VFR BLD AUTO: 24.9 % (ref 34–46)
HGB BLD-MCNC: 8.4 GM/DL (ref 11.4–16)
LYMPHOCYTES # SPEC AUTO: 1.4 K/UL (ref 1–4.8)
LYMPHOCYTES NFR SPEC AUTO: 33 %
MCH RBC QN AUTO: 30.9 PG (ref 25–35)
MCHC RBC AUTO-ENTMCNC: 33.5 G/DL (ref 31–37)
MCV RBC AUTO: 92.2 FL (ref 80–100)
MONOCYTES # BLD AUTO: 0.3 K/UL (ref 0–1)
MONOCYTES NFR BLD AUTO: 7 %
NEUTROPHILS # BLD AUTO: 2.3 K/UL (ref 1.3–7.7)
NEUTROPHILS NFR BLD AUTO: 55 %
PLATELET # BLD AUTO: 147 K/UL (ref 150–450)
POTASSIUM SERPL-SCNC: 3.7 MMOL/L (ref 3.5–5.1)
SODIUM SERPL-SCNC: 137 MMOL/L (ref 137–145)
WBC # BLD AUTO: 4.2 K/UL (ref 3.8–10.6)

## 2023-06-01 RX ADMIN — ACETAMINOPHEN PRN MG: 325 TABLET, FILM COATED ORAL at 20:04

## 2023-06-01 RX ADMIN — FAMOTIDINE SCH MG: 20 TABLET, FILM COATED ORAL at 07:52

## 2023-06-01 RX ADMIN — KETOROLAC TROMETHAMINE PRN MG: 15 INJECTION, SOLUTION INTRAMUSCULAR; INTRAVENOUS at 07:55

## 2023-06-01 RX ADMIN — VANCOMYCIN HYDROCHLORIDE SCH MG: 125 CAPSULE ORAL at 22:47

## 2023-06-01 RX ADMIN — KETOROLAC TROMETHAMINE PRN MG: 15 INJECTION, SOLUTION INTRAMUSCULAR; INTRAVENOUS at 15:49

## 2023-06-01 RX ADMIN — MAGNESIUM SULFATE IN DEXTROSE SCH MLS/HR: 10 INJECTION, SOLUTION INTRAVENOUS at 12:30

## 2023-06-01 RX ADMIN — HYDROMORPHONE HYDROCHLORIDE PRN MG: 1 INJECTION, SOLUTION INTRAMUSCULAR; INTRAVENOUS; SUBCUTANEOUS at 03:53

## 2023-06-01 RX ADMIN — VANCOMYCIN HYDROCHLORIDE SCH MG: 125 CAPSULE ORAL at 00:27

## 2023-06-01 RX ADMIN — MAGNESIUM SULFATE IN DEXTROSE SCH MLS/HR: 10 INJECTION, SOLUTION INTRAVENOUS at 10:36

## 2023-06-01 RX ADMIN — CHOLESTYRAMINE SCH GM: 4 POWDER, FOR SUSPENSION ORAL at 07:52

## 2023-06-01 RX ADMIN — HYDROMORPHONE HYDROCHLORIDE PRN MG: 1 INJECTION, SOLUTION INTRAMUSCULAR; INTRAVENOUS; SUBCUTANEOUS at 11:27

## 2023-06-01 RX ADMIN — VANCOMYCIN HYDROCHLORIDE SCH MG: 125 CAPSULE ORAL at 16:45

## 2023-06-01 RX ADMIN — CHOLESTYRAMINE SCH GM: 4 POWDER, FOR SUSPENSION ORAL at 19:54

## 2023-06-01 RX ADMIN — VANCOMYCIN HYDROCHLORIDE SCH MG: 125 CAPSULE ORAL at 05:38

## 2023-06-01 RX ADMIN — HYDROMORPHONE HYDROCHLORIDE PRN MG: 1 INJECTION, SOLUTION INTRAMUSCULAR; INTRAVENOUS; SUBCUTANEOUS at 19:22

## 2023-06-01 RX ADMIN — FAMOTIDINE SCH MG: 20 TABLET, FILM COATED ORAL at 19:54

## 2023-06-01 RX ADMIN — VANCOMYCIN HYDROCHLORIDE SCH MG: 125 CAPSULE ORAL at 11:21

## 2023-06-01 RX ADMIN — HYDROMORPHONE HYDROCHLORIDE PRN MG: 1 INJECTION, SOLUTION INTRAMUSCULAR; INTRAVENOUS; SUBCUTANEOUS at 15:49

## 2023-06-01 RX ADMIN — CEFAZOLIN SCH: 330 INJECTION, POWDER, FOR SOLUTION INTRAMUSCULAR; INTRAVENOUS at 04:59

## 2023-06-01 RX ADMIN — HYDROMORPHONE HYDROCHLORIDE PRN MG: 1 INJECTION, SOLUTION INTRAMUSCULAR; INTRAVENOUS; SUBCUTANEOUS at 07:54

## 2023-06-01 RX ADMIN — CEFAZOLIN SCH MLS/HR: 330 INJECTION, POWDER, FOR SOLUTION INTRAMUSCULAR; INTRAVENOUS at 11:21

## 2023-06-01 RX ADMIN — HYDROMORPHONE HYDROCHLORIDE PRN MG: 1 INJECTION, SOLUTION INTRAMUSCULAR; INTRAVENOUS; SUBCUTANEOUS at 22:47

## 2023-06-01 NOTE — P.PN
Subjective


Progress Note Date: 06/01/23





CHIEF COMPLAINT: C. diff colitis





HISTORY OF PRESENT ILLNESS: Patient reports that the diarrhea is starting to 

slow down.  She does have crampy abdominal pain across the upper abdomen 

intermittently.  She denies any blood in her stools.  Denies any nausea or 

vomiting.  She is tolerating diet.  Afebrile.  WBC 4. 2HB8.4





PHYSICAL EXAM: 


VITAL SIGNS: Reviewed.


GENERAL: Well-developed in no acute distress. 


HEENT:  No sclera icterus. Extraocular movements grossly intact.  Moist buccal 

mucosa. Head is atraumatic, normocephalic. 


ABDOMEN:  Soft.  Nondistended.  Tenderness across upper abdomen


NEUROLOGIC: Alert and oriented. Cranial nerves II through XII grossly intact.





ASSESSMENT: 


1.  C. diff colitis contributing to patient's abdominal pain and diarrhea.  Not 

concerned about patient's gallbladder findings at this time.








PLAN: 


-Continue treatment for C. diff colitis


-Continue supportive care


-Continue regular diet





Physician Assistant note has been reviewed by physician. Signing provider agrees

with the documented findings, assessment, and plan of care.  Patient doing 

better today.  Abdominal cramps are improved.  Frequency of diarrhea also 

improved.  Last episode of loose stool was at 745 this morning.  Continue 

antibiotics.  Continue diet.  Will follow.





Objective





- Vital Signs


Vital signs: 


                                   Vital Signs











Temp  98.5 F   06/01/23 09:45


 


Pulse  55 L  06/01/23 09:45


 


Resp  16   06/01/23 09:45


 


BP  127/71   06/01/23 09:45


 


Pulse Ox  96   06/01/23 09:45


 


FiO2      








                                 Intake & Output











 05/31/23 06/01/23 06/01/23





 18:59 06:59 18:59


 


Intake Total 540  118


 


Balance 540  118


 


Intake:   


 


    


 


    Sodium Chloride 0.9% 1, 300  





    000 ml @ 130 mls/hr IV .   





    Q7H42M [facility] Rx#:[id number]   


 


  Oral 240  118


 


Other:   


 


  Voiding Method  Toilet Toilet


 


  # Voids  3 


 


  # Bowel Movements 10 9 














- Labs


CBC & Chem 7: 


                                 06/01/23 05:57





                                 06/01/23 05:57


Labs: 


                  Abnormal Lab Results - Last 24 Hours (Table)











  06/01/23 06/01/23 Range/Units





  05:57 05:57 


 


RBC  2.70 L   (3.80-5.40)  m/uL


 


Hgb  8.4 L   (11.4-16.0)  gm/dL


 


Hct  24.9 L   (34.0-46.0)  %


 


Plt Count  147 L   (150-450)  k/uL


 


Chloride   111 H  ()  mmol/L


 


Carbon Dioxide   20 L  (22-30)  mmol/L


 


Calcium   7.7 L  (8.4-10.2)  mg/dL

## 2023-06-01 NOTE — P.PN
Subjective


Progress Note Date: 23











This is a pleasant 38-year-old female who presented to the emergency department 

after being discharged one day previous after  that was done on 

2023 here with a full-term baby.  Patient is not breast-feeding and 

history clear formula feeding and did have some postoperative fevers after C-

section was maintained on IV antibiotics along with a Z-Karlos prior to 

hospitalization for an upper respiratory infection.  Patient presented to the ER

with further multiple episodes of diarrhea and abdominal pain.  Patient reports 

her incision site is clean and dry with no drainage or purulence noted.  Patient

denies any fevers at home.  Patient not really taking in much oral intake and 

has not been eating very well.  Patient was started on vancomycin in the 

emergency department.  Patient was admitted for C. diff infection.  Given 

patient just delivered a few days ago and was on antibiotics we'll consult 

infectious disease along with OB/GYN and appreciate input and recommendations.  

Patient is having continued abdominal pain maintained on Dilaudid and recommend 

other alternatives as patient also is experiencing a headache and will add 

Tylenol.  Patient also having some nausea and will increase Zofran.  Patient did

have a gallbladder ultrasound along with abdominal CT which showed a diffusely 

thickened ascending transverse and proximal descending colon to correlate for 

colitis along with subcutaneous emphysema along the anterior lateral regions 

most likely related to recent  with gallstone and/or cholecystitis.  

Abdominal ultrasound was done of the gallbladder showing markedly thickened 

gallbladder walls with some periCholecystic fluid that may be present and 

clinical correlation for acute cholecystitis.  General surgery was consulted and

pending.  Patient was started on IV hydration and a regular diet.





2023





Patient is seen and evaluated in follow-up this morning and continued on oral 

vancomycin with infectious disease along with general surgery following.  No 

plans for surgical intervention at this time.  Patient reports she is continuing

to have loose stools and diarrhea although becoming less frequent and has been 

keeping a diary of the amount and frequency.  Patient initially was unable to 

tolerate Questran although was able to this morning and is noticing some 

improvement.  Patient was also evaluated by OB/GYN she recently had a  

and is medically stable from their standpoint.  No plans for surgical 

intervention and recommending to continue with antibiotics and hydration.  

Patient's magnesium slightly low and will replace and follow up with repeat 

labs.  Given patient's CT findings and discussed with infectious disease patient

will require a 2 week course of oral antibiotics on discharge.  Will follow up 

with patient in the a.m. with probable discharge in 24 hours.  Patient is 

currently afebrile denies chest pain or shortness of breath.  Patient did have a

brief episode of anxiety with some shortness of breath associated although vital

signs are stable and oxygen saturations are 97% on room air.  Will add incentive

spirometer as patient is less than 1 week postoperative .  Encourage 

the patient uses at least 10 times every hour while awake.  Patient reports that

tolerating some oral intake and denies vomiting and having less nausea.  Patient

reports headache is improved as well.





Review of systems:


Constitutional: No reports of fatigue, fever, or chills


Cardiovascular: No reports of chest pain or palpitations


Respiratory: No reports of shortness of breath or cough


GI: reports of occasional nausea, no vomiting, reports continued diarrhea 

although less frequent


: No reports of dysuria or retention


Neurovascular: No reports of weakness or numbness





All medications have been reviewed








PHYSICAL EXAMINATION: 





GENERAL: The patient is alert and oriented x4,  Well developed, well nourished. 


HEENT: Pupils are round and equally reacting to light. EOMI. no scleral icterus.

No conjunctival pallor. Normocephalic, atraumatic. No pharyngeal erythema. No 

thyromegaly.  


CARDIOVASCULAR: S1 and S2  muffled 


PULMONARY: diminished breath sounds bilaterally with no wheezing or rhonchi 

noted. 


ABDOMEN: soft.  Less tender on exam of the left and right lower quadrant.  

Status post  surgical site is dry and intact with no drainage noted. 

non-distended, normoactive bowel sounds. No palpable organomegaly. 


MUSCULOSKELETAL: No joint swelling or deformity.


EXTREMITIES: No cyanosis, clubbing, or pedal edema.  


NEUROLOGICAL: Gross neurological examination did not reveal any focal deficits. 




SKIN: No rashes.  Pale





Assessment:





Acute C. diff colitis status post recent antibiotics for postoperative fever 

from 


Acute hospitalization with  delivery on 2023


Gallbladder wall thickening with concerns of possible acute cholecystitis


Hypokalemia, improving


Hypomagnesemia, improving


Former smoker


GI prophylaxis


DVT prophylaxis


Full code





Plan:





Recommend to continue with current medications and management with multiple 

medical consultations following.  Infectious disease along with OB/GYN and 

general surgery following with no plans for surgical intervention at this time 

recommend outpatient follow-up.  Patient was evaluated by OB/GYN and medically 

stable recommending follow-up appointment at scheduled postpartum visit


Patient is continued on oral vancomycin and has started Questran having less 

frequent stools although continues with multiple episodes of diarrhea and repo

rts is improving


Encourage oral intake and increased activity as tolerated


Patient with a brief episode of shortness of breath although extremely anxious 

about having a new baby at home and being hospitalized again without being with 

her children.  Patient is afebrile with no white count and oxygen saturation is 

97% on room air.  Will add incentive spirometer and encourage the patient uses 

at least 10 times every hour while awake.  Patient is postoperative  

from 2023 


Will replace electrolytes per protocol and follow-up with repeat labs and 

continue gentle IV hydration


Probable discharge in 24 hours.  Discussed with infectious disease and will 

require 2 weeks of oral vancomycin on discharge.








The impression and plan of care has been dictated by Rox Davis, nurse 

practitioner as directed.





Dr. Amina MD


I have performed a history and examination and MDM of this patient, discussed 

the same with the dictator, and  agree with the dictator's assessment and plan 

as written ,documented as a scribe. Based on total visit time,  I have performed

more than 50% of the visit. Any additional findings or plans will be noted. 








Objective





- Vital Signs


Vital signs: 


                                   Vital Signs











Temp  98.2 F   23 07:55


 


Pulse  55 L  23 07:55


 


Resp  16   23 07:55


 


BP  146/90   23 07:55


 


Pulse Ox  95   23 07:55


 


FiO2      








                                 Intake & Output











 23





 18:59 06:59 18:59


 


Intake Total 540  


 


Balance 540  


 


Intake:   


 


    


 


    Sodium Chloride 0.9% 1, 300  





    000 ml @ 130 mls/hr IV .   





    Q7H42M [facility] Rx#:[id number]   


 


  Oral 240  


 


Other:   


 


  Voiding Method  Toilet 


 


  # Voids  3 


 


  # Bowel Movements 10 9 














- Labs


CBC & Chem 7: 


                                 23 05:57





                                 23 05:57


Labs: 


                  Abnormal Lab Results - Last 24 Hours (Table)











  23 Range/Units





  05:57 05:57 


 


RBC  2.70 L   (3.80-5.40)  m/uL


 


Hgb  8.4 L   (11.4-16.0)  gm/dL


 


Hct  24.9 L   (34.0-46.0)  %


 


Plt Count  147 L   (150-450)  k/uL


 


Chloride   111 H  ()  mmol/L


 


Carbon Dioxide   20 L  (22-30)  mmol/L


 


Calcium   7.7 L  (8.4-10.2)  mg/dL

## 2023-06-01 NOTE — P.PN
Progress Note - Text


Progress Note Date: 23





37 yo  s/p LTCS on .  She was admitted on  with a positive C. diff 

after multiple doses of antibiotics.  Patient is feeling much improved today.  

She states her last stool was at 7:45 AM.








VSS


In general this is a well-nourished well-developed nonpregnant female in no 

acute distress, breathing is noted to be nonlabored, heart has regular rhythm, 

abdomen is post partum,  uterus is noted be firm and one finger breaths below 

the umbilicus,  scars noted to be intact with no drainage or erythema 

appreciated.  Abdomen is soft and nontender.





A\  s/p LTCS , doing well postoperatively 


    C diff.  On IV vancomycin, improving clinically





P\ 


Continue current IV vancomycin per surgery and internal medicine 

recommendations.


Patient is doing well postoperatively from a obstetric standpoint.  Discharge 

instructions are reviewed from my standpoint.  Patient is encouraged to call the

office and follow be next week for routine postoperative check.


All questions are answered 2 patient and  satisfaction.

## 2023-06-01 NOTE — XR
EXAMINATION TYPE: XR chest 1V portable

 

DATE OF EXAM: 6/1/2023

 

Comparison: None.

 

Clinical History: 38-year-old female short of breath

 

Findings:

Heart mildly enlarged. Hazy mid and lower lung densities with mild interstitial prominence. Possible 
trace left effusion. Left base is underpenetrated and not well assessed.

 

 

Impression:

Mild cardiomegaly and hazy/interstitial densities especially in the mid and lower lungs. Correlate to
 exclude pulmonary vascular congestion. Possible trace left effusion.

## 2023-06-01 NOTE — P.PN
Subjective


Progress Note Date: 23


Principal diagnosis: 


C. diff colitis





Patient is a 38-year-old  female who is status post  

subsequently presenting to the hospital with significant diarrhea abdominal pain

with evidence of colitis on the CT stool for C. diff has been positive


On today's evaluation that is 2023, patient denies having any fever or any

chills, the patient diarrhea has decreased in frequency mention she had about 3 

episodes since midnight still has some crampy abdominal pain but no worsening 

nausea no vomiting no chest pain shortness of breath or cough


,





Objective





- Vital Signs


Vital signs: 


                                   Vital Signs











Temp  98.5 F   23 09:45


 


Pulse  55 L  23 09:45


 


Resp  16   23 09:45


 


BP  127/71   23 09:45


 


Pulse Ox  96   23 09:45


 


FiO2      








                                 Intake & Output











 23





 18:59 06:59 18:59


 


Intake Total 540  118


 


Balance 540  118


 


Intake:   


 


    


 


    Sodium Chloride 0.9% 1, 300  





    000 ml @ 130 mls/hr IV .   





    Q7H42M [facility] Rx#:[id number]   


 


  Oral 240  118


 


Other:   


 


  Voiding Method  Toilet Toilet


 


  # Voids  3 


 


  # Bowel Movements 10 9 














- Exam


GENERAL DESCRIPTION: Middle-age female lying in bed in no distress





RESPIRATORY SYSTEM: Unlabored breathing , decreased breath sounds at bases





HEART: S1 S2 regular rate and rhythm ,





ABDOMEN: Soft , mild tenderness





EXTREMITIES: No edema feet








- Labs


CBC & Chem 7: 


                                 23 05:57





                                 23 05:57


Labs: 


                  Abnormal Lab Results - Last 24 Hours (Table)











  23 Range/Units





  05:57 05:57 


 


RBC  2.70 L   (3.80-5.40)  m/uL


 


Hgb  8.4 L   (11.4-16.0)  gm/dL


 


Hct  24.9 L   (34.0-46.0)  %


 


Plt Count  147 L   (150-450)  k/uL


 


Chloride   111 H  ()  mmol/L


 


Carbon Dioxide   20 L  (22-30)  mmol/L


 


Calcium   7.7 L  (8.4-10.2)  mg/dL














Assessment and Plan


(1) C. difficile colitis


Current Visit: Yes   Status: Acute   Code(s): A04.72 - ENTEROCOLITIS D/T CLOSTR

IDIUM DIFFICILE, NOT SPCF AS RECUR   SNOMED Code(s): 878316642


   


Plan: 


1patient presented hospital with extensive diarrhea in this patient with a 

recent  for a full-term baby did have a postop fever and has been 

exposed to antibiotic CT did shows extensive colitis involving the transverse 

descending and sigmoid colon concerning for extensive C. difficile colitis due 

to likely etiology


2-patient to continue with vancomycin to 50 mg p.o. every 6 hours, along with 

Questran for symptomatic relief


3-patient has been encouraged to increase probiotic and yogurt intake


 


Time with Patient: Less than 30

## 2023-06-01 NOTE — CT
EXAMINATION TYPE: CT chest angio for PE

CT DLP: 370.90 mGycm, Automated exposure control for dose reduction was used.

 

DATE OF EXAM: 6/1/2023 5:41 PM

 

COMPARISON: Chest radiograph 6/1/2023

 

CLINICAL INDICATION:Female, 38 years old with history of chest pain elevated DDimer; chest pain, elev
ated D dimer

 

TECHNIQUE/CONTRAST: 

CTA scan of the thorax is performed with IV Contrast, patient injected with 100 mL of Isovue 370, pul
monary embolism protocol.  MIP images are created and reviewed.  

 

FINDINGS: 

 

Pulmonary Artery: There is no evidence for a filling defect within the pulmonary vasculature to sugge
st acute pulmonary embolism.  The pulmonary artery is of normal size. Reflux of contrast into the IVC
.

Lungs/Pleura: Trace left and small right pleural effusions with associated atelectasis. Few patchy sc
attered round glass opacities within the medial aspect of the left upper lobe, anterior aspect of the
 mid left upper lobe. Intralobular septal thickening identified. 

Airway: Large airways are patent.

Heart: Mildly prominent size. No pericardial effusion.

Vasculature: No evidence of aortic aneurysm.

Mediastinum: No gross evidence of adenopathy.

Musculoskeletal: No acute osseous abnormalities

Soft Tissues: Unremarkable.

Lower neck: No significant findings.

Upper Abdomen: Liver is enlarged measuring up to 18.8 cm in AP dimension. Spleen is enlarged measurin
g up to 14.3 cm in AP dimension. Small hiatal hernia.

 

IMPRESSION:

1. No evidence of pulmonary embolism.

 

2. Cardiomegaly with small right and trace left pleural effusions with interlobular septal thickening
 suggesting CHF exacerbation.

 

3.  Upper pole of regions of groundglass opacities within the left upper lobe suggesting an infectiou
s/inflammatory process.

 

4. Hepatosplenomegaly.

## 2023-06-02 VITALS
TEMPERATURE: 98.3 F | HEART RATE: 50 BPM | SYSTOLIC BLOOD PRESSURE: 150 MMHG | DIASTOLIC BLOOD PRESSURE: 83 MMHG | RESPIRATION RATE: 18 BRPM

## 2023-06-02 LAB
BASOPHILS # BLD AUTO: 0 K/UL (ref 0–0.2)
BASOPHILS NFR BLD AUTO: 0 %
EOSINOPHIL # BLD AUTO: 0.1 K/UL (ref 0–0.7)
EOSINOPHIL NFR BLD AUTO: 2 %
ERYTHROCYTE [DISTWIDTH] IN BLOOD BY AUTOMATED COUNT: 2.86 M/UL (ref 3.8–5.4)
ERYTHROCYTE [DISTWIDTH] IN BLOOD: 13 % (ref 11.5–15.5)
HCT VFR BLD AUTO: 26.2 % (ref 34–46)
HGB BLD-MCNC: 9 GM/DL (ref 11.4–16)
LYMPHOCYTES # SPEC AUTO: 1.5 K/UL (ref 1–4.8)
LYMPHOCYTES NFR SPEC AUTO: 29 %
MCH RBC QN AUTO: 31.5 PG (ref 25–35)
MCHC RBC AUTO-ENTMCNC: 34.3 G/DL (ref 31–37)
MCV RBC AUTO: 91.7 FL (ref 80–100)
MONOCYTES # BLD AUTO: 0.3 K/UL (ref 0–1)
MONOCYTES NFR BLD AUTO: 6 %
NEUTROPHILS # BLD AUTO: 3.2 K/UL (ref 1.3–7.7)
NEUTROPHILS NFR BLD AUTO: 61 %
PLATELET # BLD AUTO: 190 K/UL (ref 150–450)
WBC # BLD AUTO: 5.3 K/UL (ref 3.8–10.6)

## 2023-06-02 RX ADMIN — KETOROLAC TROMETHAMINE PRN MG: 15 INJECTION, SOLUTION INTRAMUSCULAR; INTRAVENOUS at 08:14

## 2023-06-02 RX ADMIN — HYDROMORPHONE HYDROCHLORIDE PRN MG: 1 INJECTION, SOLUTION INTRAMUSCULAR; INTRAVENOUS; SUBCUTANEOUS at 06:08

## 2023-06-02 RX ADMIN — CHOLESTYRAMINE SCH GM: 4 POWDER, FOR SUSPENSION ORAL at 08:14

## 2023-06-02 RX ADMIN — VANCOMYCIN HYDROCHLORIDE SCH MG: 125 CAPSULE ORAL at 12:08

## 2023-06-02 RX ADMIN — FAMOTIDINE SCH MG: 20 TABLET, FILM COATED ORAL at 08:14

## 2023-06-02 RX ADMIN — VANCOMYCIN HYDROCHLORIDE SCH MG: 125 CAPSULE ORAL at 06:08

## 2023-06-02 RX ADMIN — HYDROMORPHONE HYDROCHLORIDE PRN MG: 1 INJECTION, SOLUTION INTRAMUSCULAR; INTRAVENOUS; SUBCUTANEOUS at 09:54

## 2023-06-02 RX ADMIN — HYDROMORPHONE HYDROCHLORIDE PRN MG: 1 INJECTION, SOLUTION INTRAMUSCULAR; INTRAVENOUS; SUBCUTANEOUS at 02:31

## 2023-06-02 NOTE — P.PN
Subjective


Progress Note Date: 06/02/23





CHIEF COMPLAINT: C. diff colitis





HISTORY OF PRESENT ILLNESS: Patient has had improvement in the diarrhea.  She 

had 3 episodes yesterday and one episode this morning.  She has been complaining

more of shortness of breath and was found have evidence of fluid overload CTA 

was negative for PE.  She has been receiving IV Lasix.  Patient reports still 

shortness of breath and leg swelling and difficulty with laying flat.  She does 

report that the abdominal pain is now also in the lower abdomen.  She did pass 

some blood clots vaginally.  Afebrile.  WBC is 5.3 Hgb 9.0 platelets 190 

magnesium 1.7








PHYSICAL EXAM: 


VITAL SIGNS: Reviewed.


GENERAL: Well-developed in no acute distress. 


HEENT:  No sclera icterus. Extraocular movements grossly intact.  Moist buccal 

mucosa. Head is atraumatic, normocephalic. 


ABDOMEN:  Soft.  Nondistended.  Tenderness across upper abdomen


NEUROLOGIC: Alert and oriented. Cranial nerves II through XII grossly intact.





ASSESSMENT: 


1.  C. diff colitis contributing to patient's abdominal pain and diarrhea.  Not 

concerned about patient's gallbladder findings at this time.








PLAN: 


-Continue treatment for C. diff colitis


-Continue supportive care


-Continue regular diet


-Fluid overload management per medicine service





Physician Assistant note has been reviewed by physician. Signing provider agrees

with the documented findings, assessment, and plan of care. 





I have personally seen and examined the patient, reviewed the NP /PAs history, 

exam and MDM and agree with the assessment and plan as written. Based on total 

visit time, I have performed more than 50% of the visit.





As above:  Patient doing well today.  Apparently had shortness of breath last 

night.  That is improved.  Minimal abdominal pain.  Diarrhea improved.  Continue

antibiotics.  Continue diet as tolerated.





Objective





- Vital Signs


Vital signs: 


                                   Vital Signs











Temp  98.3 F   06/02/23 07:00


 


Pulse  50 L  06/02/23 08:00


 


Resp  18   06/02/23 08:00


 


BP  150/83   06/02/23 07:00


 


Pulse Ox  95   06/02/23 07:00


 


FiO2      








                                 Intake & Output











 06/01/23 06/02/23 06/02/23





 18:59 06:59 18:59


 


Intake Total 354  118


 


Balance 354  118


 


Intake:   


 


  Oral 354  118


 


Other:   


 


  Voiding Method Toilet Toilet Toilet


 


  # Voids 3 5 


 


  # Bowel Movements 3 3 














- Labs


CBC & Chem 7: 


                                 06/02/23 06:24





                                 06/01/23 05:57


Labs: 


                  Abnormal Lab Results - Last 24 Hours (Table)











  06/01/23 06/02/23 Range/Units





  16:17 06:24 


 


RBC   2.86 L  (3.80-5.40)  m/uL


 


Hgb   9.0 L  (11.4-16.0)  gm/dL


 


Hct   26.2 L  (34.0-46.0)  %


 


D-Dimer  26.58 H   (<0.60)  mg/L FEU

## 2023-06-02 NOTE — P.PN
Subjective


Progress Note Date: 23


Principal diagnosis: 


C. diff colitis





Patient is a 38-year-old  female who is status post  

subsequently presenting to the hospital with significant diarrhea abdominal pain

with evidence of colitis on the CT stool for C. diff has been positive


On today's evaluation that is 2023, patient remains to be afebrile, the 

patient diarrhea has decreased in frequency mention did have 2 bowel movements 

this morning slightly following up, the patient abdominal pain has decreased in 

intensity, no nausea no vomiting no chest pain shortness of breath or cough


,





Objective





- Vital Signs


Vital signs: 


                                   Vital Signs











Temp  98.3 F   23 07:00


 


Pulse  50 L  23 08:00


 


Resp  18   23 08:00


 


BP  150/83   23 07:00


 


Pulse Ox  95   23 07:00


 


FiO2      








                                 Intake & Output











 23





 18:59 06:59 18:59


 


Intake Total 354  118


 


Balance 354  118


 


Intake:   


 


  Oral 354  118


 


Other:   


 


  Voiding Method Toilet Toilet Toilet


 


  # Voids 3 5 


 


  # Bowel Movements 3 3 














- Exam


GENERAL DESCRIPTION: Middle-age female lying in bed in no distress





RESPIRATORY SYSTEM: Unlabored breathing , decreased breath sounds at bases





HEART: S1 S2 regular rate and rhythm ,





ABDOMEN: Soft , mild tenderness





EXTREMITIES: No edema feet








- Labs


CBC & Chem 7: 


                                 23 06:24





                                 23 05:57


Labs: 


                  Abnormal Lab Results - Last 24 Hours (Table)











  23 Range/Units





  16:17 06:24 


 


RBC   2.86 L  (3.80-5.40)  m/uL


 


Hgb   9.0 L  (11.4-16.0)  gm/dL


 


Hct   26.2 L  (34.0-46.0)  %


 


D-Dimer  26.58 H   (<0.60)  mg/L FEU














Assessment and Plan


(1) C. difficile colitis


Status: Acute   Code(s): A04.72 - ENTEROCOLITIS D/T CLOSTRIDIUM DIFFICILE, NOT 

SPCF AS RECUR   SNOMED Code(s): 597985834


   


Plan: 


1patient presented hospital with extensive diarrhea in this patient with a 

recent  for a full-term baby did have a postop fever and has been 

exposed to antibiotic CT did shows extensive colitis involving the transverse 

descending and sigmoid colon concerning for extensive C. difficile colitis due 

to likely etiology


2-patient is slowly clinically improving and will continue with vancomycin 250 

mg p.o. every 6 hours 10 days on discharge, along with Questran for symptomatic

relief, and close outpatient follow-up





 


Time with Patient: Less than 30

## 2023-06-03 NOTE — P.DS
Providers


Date of admission: 


23 13:11





Expected date of discharge: 23


Attending physician: 


Jim Jovel MD





Consults: 





                                        





23 21:23


Consult Physician Urgent 


   Consulting Provider: Dylan Stinson


   Consult Reason/Comments: Suspected cholecystitis; patient request for Dr Stinson


   Do you want consulting provider notified?: Already Contacted





23 09:23


Consult Physician Routine 


   Consulting Provider: Shelton De Guzman


   Consult Reason/Comments: cdiff/ post partum c section last week


   Do you want consulting provider notified?: Yes


Consult Physician Urgent 


   Consulting Provider: Denisa Whelan


   Consult Reason/Comments: cdiff/ post partum c section last week


   Do you want consulting provider notified?: Yes











Primary care physician: 


Stated None





Hospital Course: 











Final diagnosis





Acute C. diff colitis status post recent antibiotics for postoperative fever 

from 


Acute hospitalization with  delivery on 2023


Gallbladder wall thickening with concerns of possible acute cholecystitis


Shortness of breath with elevated d-dimer, ruled out PE, most likely secondary 

to IV fluids component of anxiety


Elevated d-dimer likely secondary to recent  section


Hypokalemia, improved 


Hypomagnesemia improved


Former smoker


GI prophylaxis


DVT prophylaxis


Full code








Discharge disposition


Patient is being discharged in a stable condition with guarded prognosis to 

home.  Patient will follow-up with Dr. Libby Bueno in the outpatient setting

upon discharge to establish.  Patient is to continue with oral vancomycin 4 

times daily for the next 10 days per ID recommendations and outpatient follow-up

with OB/GYN as scheduled.  Total time taken is greater than 35 minutes.





Hospital course


This is a 38-year-old female who was recently admitted with abdominal pain and 

diarrhea found to have C. diff colitis.  Patient was recently just discharged 

one day prior status post  and had postoperative fever maintained on 

antibiotics.  Patient seen and evaluated by OB/GYN with no concerns recommend 

outpatient follow-up as well as general surgery for abdominal pain with concerns

of gallbladder wall thickening.  Patient did have an episode of shortness of 

breath with a chest x-ray that showed some vascular congestion and improved with

Lasix.  Patient did have elevated d-dimer most likely secondary to  

section and recent surgery and underwent CTA which showed no PE.  Patient 

continued to have diarrhea with infectious disease following maintained on 

vancomycin showing some improvements along with Questran.  Patient instructed to

continue with vancomycin 250 mg tablets for the next 10 days to complete the 

course and close outpatient follow-up.  Patient did not currently have a primary

care provider and is set to establish this week with Dr. Bueno in the 

outpatient setting as her OB/GYN at her follow-up appointment next week.  

Patient hemoglobin is stable at 9 on discharge.  Patient has been cleared by 

consultations.  Please refer to consultation notes for further HPI. Currently no

reports of chest pain, shortness of breath, or palpitations.  Patient is 

afebrile.  No reports of nausea or vomiting and patient is tolerating diet.  

Patient instructed to continue soft diet for the next few days and slowly 

advanced once diarrhea improves.  Patient okay to use Questran as needed for 

loose stools and hold if having formed stools.  Patient will be discharged home 

today.





Physical exam:








Gen: This is a 38-year-old female who is awake, alert and oriented 3, well-

developed, well-nourished


HEENT: Head is atraumatic, normocephalic. Pupils equal, round. Sclerae is 

anicteric. 


NECK: Supple. No JVD. No lymphadenopathy. No thyromegaly. 


LUNGS: Clear to auscultation. No wheezes or rhonchi.  No intercostal 

retractions.


HEART: Regular rate and rhythm. No murmur. 


ABDOMEN: Soft. Bowel sounds are present. No masses.  No tenderness.


EXTREMITIES: No pedal edema.  No calf tenderness.


NEUROLOGICAL: Patient is awake, alert and oriented x3. Cranial nerves 2 through 

12 are grossly intact. 





Please refer to medication reconciliation sheet for a list of medications.





The impression and plan of care has been dictated by Rox Davis, Nurse 

Practitioner as directed.





Dr. Amina MD


I have performed a history and examination and MDM of this patient, discussed 

the same with the dictator, and  agree with the dictator's assessment and plan 

as written ,documented as a scribe. Based on total visit time,  I have performed

more than 50% of the visit.  


Patient Condition at Discharge: Stable





Plan - Discharge Summary


Discharge Rx Participant: No


New Discharge Prescriptions: 


New


   Famotidine [Pepcid] 20 mg PO BID 15 Days #30 tablet


   traMADol HCL 50 mg PO Q6H 3 Days #12 tab


   Vancomycin HCl [Vancocin HCl] 250 mg PO QID 14 Days #56 capsule


   Acetaminophen Tab [Tylenol] 650 mg PO Q6HR PRN  tab


     PRN Reason: Mild Pain Or Fever > 100.5


   Furosemide [Lasix] 40 mg PO DAILY PRN 3 Days #3 tablet


     PRN Reason: Shortness Of Breath


   Cholestyramine (with Sugar) [Questran] 4 gm PO BID PRN #20 packet


     PRN Reason: Diarrhea





Continue


   Prenatal Vit No.179/Iron/Folic [Prenatal Tablet] 1 tab PO DAILY


   HYDROcodone/APAP 5-325MG [Norco 5-325] 1 - 2 tab PO Q6H PRN


     PRN Reason: Pain





Discontinued


   metroNIDAZOLE [Flagyl] 500 mg PO BID


Discharge Medication List





Prenatal Vit No.179/Iron/Folic [Prenatal Tablet] 1 tab PO DAILY 23 [Hi

story]


HYDROcodone/APAP 5-325MG [Norco 5-325] 1 - 2 tab PO Q6H PRN 23 [History]


Acetaminophen Tab [Tylenol] 650 mg PO Q6HR PRN  tab 23 [Rx]


Cholestyramine (with Sugar) [Questran] 4 gm PO BID PRN #20 packet 23 [Rx]


Famotidine [Pepcid] 20 mg PO BID 15 Days #30 tablet 23 [Rx]


Furosemide [Lasix] 40 mg PO DAILY PRN 3 Days #3 tablet 23 [Rx]


Vancomycin HCl [Vancocin HCl] 250 mg PO QID 14 Days #56 capsule 23 [Rx]


traMADol HCL 50 mg PO Q6H 3 Days #12 tab 23 [Rx]








Follow up Appointment(s)/Referral(s): 


Dylan Stinson MD [Medical Doctor] - 1 Week


Libby Bueno NPC [STAFF PHYSICIAN] - 1 Week


Denisa Whelan MD [STAFF PHYSICIAN] - 1 Week


Ambulatory/Diagnostic Orders: 


Complete Blood Count w/diff [LAB.AMB] Time Frame: 3 Days, Location: None 

Selected


Activity/Diet/Wound Care/Special Instructions: 


Activity Limited until follow-up


Follow-up with primary care provider to establish


Continue taking medications as prescribed


Continue antibiotics until completely finished


May use Questran as needed if continuing to have loose stools and hold if having

 formed stools


Continue using incentive spirometer at least 10 times every hour while awake


Meticulous handwashing


Keep your OB appointment for follow-up





Discharge Disposition: HOME SELF-CARE

## 2024-09-29 NOTE — MM
Reason for Exam: Screening  (asymptomatic). 

Baseline mammogram.





Patient History: 

Menarche at age 13. First Full-Term Pregnancy at age 28. Premenopausal.

Maternal aunt had breast cancer. Mother had breast cancer, age 56.

Last menstrual period: 09/23/2024





Risk Values: 

Ruth 5 year model risk: 0.9%.

NCI Lifetime model risk: 19.0%.





Prior Study Comparison: 

Patient's first Mammogram. 





Tissue Density: 

The breasts are extremely dense, which lowers the sensitivity of mammography.





Findings: 

Analyzed By CAD. 

The pattern is symmetrical.

No significant interval change.

No suspicious groups of microcalcifications, spiculated or lobular masses, architectural distortion

or other secondary signs of malignancy are mammographically apparent. 





Overall Assessment: Benign, BI-RAD 2





Management: 

Screening Mammogram of both breasts in 1 year.

A negative mammogram report should not preclude additional follow up of suspicious palpable

abnormalities.

Patient should continue monthly self breast exam.

A clinical breast exam by your physician is recommended on an annual basis and results should be

correlated with mammographic findings.



Note on Ruth scores and lifetime risk:

1. A Ruth score greater than 3% is considered moderate risk. If this is the case, consider

specialist referral to assess eligibility for a risk reducing agent.

2. If overall lifetime risk for the development of breast cancer is 20% or higher, the patient may

qualify for future screening with alternating mammogram and breast MRI.



[facility], Workstation: [id number], 9/29/2024 1:38 PM.



Electronically signed and approved by: Ousmane Martinez D.O. Radiologis